# Patient Record
Sex: FEMALE | Race: BLACK OR AFRICAN AMERICAN | Employment: UNEMPLOYED | URBAN - METROPOLITAN AREA
[De-identification: names, ages, dates, MRNs, and addresses within clinical notes are randomized per-mention and may not be internally consistent; named-entity substitution may affect disease eponyms.]

---

## 2017-03-03 PROBLEM — R07.9 CHEST PAIN AT REST: Status: ACTIVE | Noted: 2017-03-03

## 2018-08-17 ENCOUNTER — HOSPITAL ENCOUNTER (INPATIENT)
Age: 58
LOS: 4 days | Discharge: HOME | DRG: 392 | End: 2018-08-23
Attending: FAMILY MEDICINE | Admitting: FAMILY MEDICINE
Payer: COMMERCIAL

## 2018-08-17 DIAGNOSIS — R10.84 GENERALIZED ABDOMINAL PAIN: Primary | ICD-10-CM

## 2018-08-17 PROCEDURE — 6370000000 HC RX 637 (ALT 250 FOR IP): Performed by: FAMILY MEDICINE

## 2018-08-17 PROCEDURE — 6360000002 HC RX W HCPCS: Performed by: FAMILY MEDICINE

## 2018-08-17 PROCEDURE — G0379 DIRECT REFER HOSPITAL OBSERV: HCPCS

## 2018-08-17 PROCEDURE — 96374 THER/PROPH/DIAG INJ IV PUSH: CPT

## 2018-08-17 PROCEDURE — C9113 INJ PANTOPRAZOLE SODIUM, VIA: HCPCS | Performed by: FAMILY MEDICINE

## 2018-08-17 PROCEDURE — 2580000003 HC RX 258: Performed by: FAMILY MEDICINE

## 2018-08-17 PROCEDURE — G0378 HOSPITAL OBSERVATION PER HR: HCPCS

## 2018-08-17 RX ORDER — GABAPENTIN 100 MG/1
100 CAPSULE ORAL 3 TIMES DAILY
Status: DISCONTINUED | OUTPATIENT
Start: 2018-08-17 | End: 2018-08-23 | Stop reason: HOSPADM

## 2018-08-17 RX ORDER — MORPHINE SULFATE 10 MG/ML
5 INJECTION, SOLUTION INTRAMUSCULAR; INTRAVENOUS
Status: COMPLETED | OUTPATIENT
Start: 2018-08-17 | End: 2018-08-17

## 2018-08-17 RX ORDER — SODIUM CHLORIDE 450 MG/100ML
INJECTION, SOLUTION INTRAVENOUS CONTINUOUS
Status: DISCONTINUED | OUTPATIENT
Start: 2018-08-17 | End: 2018-08-23 | Stop reason: HOSPADM

## 2018-08-17 RX ORDER — OXYCODONE HYDROCHLORIDE 15 MG/1
15 TABLET ORAL EVERY 4 HOURS PRN
Status: DISCONTINUED | OUTPATIENT
Start: 2018-08-17 | End: 2018-08-23 | Stop reason: HOSPADM

## 2018-08-17 RX ORDER — CARVEDILOL 25 MG/1
25 TABLET ORAL 2 TIMES DAILY WITH MEALS
Status: DISCONTINUED | OUTPATIENT
Start: 2018-08-18 | End: 2018-08-23 | Stop reason: HOSPADM

## 2018-08-17 RX ADMIN — GABAPENTIN 100 MG: 100 CAPSULE ORAL at 21:33

## 2018-08-17 RX ADMIN — OXYCODONE HYDROCHLORIDE 15 MG: 15 TABLET ORAL at 21:33

## 2018-08-17 RX ADMIN — MORPHINE SULFATE 5 MG: 10 INJECTION INTRAVENOUS at 22:44

## 2018-08-17 RX ADMIN — SODIUM CHLORIDE 8 MG/HR: 9 INJECTION, SOLUTION INTRAVENOUS at 22:16

## 2018-08-17 RX ADMIN — SODIUM CHLORIDE: 4.5 INJECTION, SOLUTION INTRAVENOUS at 22:16

## 2018-08-17 ASSESSMENT — PAIN SCALES - GENERAL
PAINLEVEL_OUTOF10: 8
PAINLEVEL_OUTOF10: 8
PAINLEVEL_OUTOF10: 7
PAINLEVEL_OUTOF10: 8

## 2018-08-17 ASSESSMENT — PAIN DESCRIPTION - ONSET: ONSET: ON-GOING

## 2018-08-17 ASSESSMENT — PAIN DESCRIPTION - DESCRIPTORS: DESCRIPTORS: ACHING;DISCOMFORT

## 2018-08-17 ASSESSMENT — PAIN DESCRIPTION - LOCATION: LOCATION: ABDOMEN

## 2018-08-17 ASSESSMENT — PAIN DESCRIPTION - FREQUENCY: FREQUENCY: CONTINUOUS

## 2018-08-17 ASSESSMENT — PAIN DESCRIPTION - ORIENTATION: ORIENTATION: RIGHT;LEFT;UPPER

## 2018-08-17 ASSESSMENT — PAIN DESCRIPTION - PAIN TYPE: TYPE: ACUTE PAIN

## 2018-08-17 ASSESSMENT — PAIN DESCRIPTION - PROGRESSION: CLINICAL_PROGRESSION: GRADUALLY WORSENING

## 2018-08-18 ENCOUNTER — ANESTHESIA (OUTPATIENT)
Dept: ENDOSCOPY | Age: 58
DRG: 392 | End: 2018-08-18
Payer: COMMERCIAL

## 2018-08-18 ENCOUNTER — ANESTHESIA EVENT (OUTPATIENT)
Dept: ENDOSCOPY | Age: 58
DRG: 392 | End: 2018-08-18
Payer: COMMERCIAL

## 2018-08-18 ENCOUNTER — APPOINTMENT (OUTPATIENT)
Dept: GENERAL RADIOLOGY | Age: 58
DRG: 392 | End: 2018-08-18
Attending: FAMILY MEDICINE
Payer: COMMERCIAL

## 2018-08-18 ENCOUNTER — APPOINTMENT (OUTPATIENT)
Dept: CT IMAGING | Age: 58
DRG: 392 | End: 2018-08-18
Attending: FAMILY MEDICINE
Payer: COMMERCIAL

## 2018-08-18 VITALS
OXYGEN SATURATION: 98 % | SYSTOLIC BLOOD PRESSURE: 121 MMHG | DIASTOLIC BLOOD PRESSURE: 66 MMHG | RESPIRATION RATE: 12 BRPM

## 2018-08-18 LAB
ALBUMIN SERPL-MCNC: 3.4 G/DL (ref 3.5–5.2)
ALP BLD-CCNC: 75 U/L (ref 35–104)
ALT SERPL-CCNC: 13 U/L (ref 0–32)
AMYLASE: 37 U/L (ref 20–100)
ANION GAP SERPL CALCULATED.3IONS-SCNC: 11 MMOL/L (ref 7–16)
AST SERPL-CCNC: 19 U/L (ref 0–31)
BILIRUB SERPL-MCNC: 0.4 MG/DL (ref 0–1.2)
BUN BLDV-MCNC: 15 MG/DL (ref 6–20)
CALCIUM SERPL-MCNC: 8.3 MG/DL (ref 8.6–10.2)
CEA: 3 NG/ML (ref 0–5.2)
CHLORIDE BLD-SCNC: 108 MMOL/L (ref 98–107)
CHOLESTEROL, TOTAL: 146 MG/DL (ref 0–199)
CO2: 21 MMOL/L (ref 22–29)
CREAT SERPL-MCNC: 1.1 MG/DL (ref 0.5–1)
GFR AFRICAN AMERICAN: >60
GFR NON-AFRICAN AMERICAN: >60 ML/MIN/1.73
GLUCOSE BLD-MCNC: 92 MG/DL (ref 74–109)
HCT VFR BLD CALC: 38.3 % (ref 34–48)
HDLC SERPL-MCNC: 37 MG/DL
HEMOGLOBIN: 12.1 G/DL (ref 11.5–15.5)
INR BLD: 1.1
LDL CHOLESTEROL CALCULATED: 88 MG/DL (ref 0–99)
LIPASE: 16 U/L (ref 13–60)
MCH RBC QN AUTO: 28.1 PG (ref 26–35)
MCHC RBC AUTO-ENTMCNC: 31.6 % (ref 32–34.5)
MCV RBC AUTO: 88.9 FL (ref 80–99.9)
PDW BLD-RTO: 13.1 FL (ref 11.5–15)
PLATELET # BLD: 179 E9/L (ref 130–450)
PMV BLD AUTO: 10.7 FL (ref 7–12)
POTASSIUM SERPL-SCNC: 3.9 MMOL/L (ref 3.5–5)
PROTHROMBIN TIME: 12.1 SEC (ref 9.3–12.4)
RBC # BLD: 4.31 E12/L (ref 3.5–5.5)
RHEUMATOID FACTOR: <10 IU/ML (ref 0–13)
SEDIMENTATION RATE, ERYTHROCYTE: 25 MM/HR (ref 0–20)
SODIUM BLD-SCNC: 140 MMOL/L (ref 132–146)
TOTAL PROTEIN: 6.3 G/DL (ref 6.4–8.3)
TRIGL SERPL-MCNC: 106 MG/DL (ref 0–149)
TSH SERPL DL<=0.05 MIU/L-ACNC: 2 UIU/ML (ref 0.27–4.2)
VLDLC SERPL CALC-MCNC: 21 MG/DL
WBC # BLD: 4 E9/L (ref 4.5–11.5)

## 2018-08-18 PROCEDURE — 85027 COMPLETE CBC AUTOMATED: CPT

## 2018-08-18 PROCEDURE — 84443 ASSAY THYROID STIM HORMONE: CPT

## 2018-08-18 PROCEDURE — 82378 CARCINOEMBRYONIC ANTIGEN: CPT

## 2018-08-18 PROCEDURE — 3700000000 HC ANESTHESIA ATTENDED CARE: Performed by: INTERNAL MEDICINE

## 2018-08-18 PROCEDURE — 88305 TISSUE EXAM BY PATHOLOGIST: CPT

## 2018-08-18 PROCEDURE — 85610 PROTHROMBIN TIME: CPT

## 2018-08-18 PROCEDURE — C9113 INJ PANTOPRAZOLE SODIUM, VIA: HCPCS | Performed by: FAMILY MEDICINE

## 2018-08-18 PROCEDURE — 72100 X-RAY EXAM L-S SPINE 2/3 VWS: CPT

## 2018-08-18 PROCEDURE — 73630 X-RAY EXAM OF FOOT: CPT

## 2018-08-18 PROCEDURE — 7100000011 HC PHASE II RECOVERY - ADDTL 15 MIN: Performed by: INTERNAL MEDICINE

## 2018-08-18 PROCEDURE — 86200 CCP ANTIBODY: CPT

## 2018-08-18 PROCEDURE — 6360000002 HC RX W HCPCS: Performed by: FAMILY MEDICINE

## 2018-08-18 PROCEDURE — 7100000010 HC PHASE II RECOVERY - FIRST 15 MIN: Performed by: INTERNAL MEDICINE

## 2018-08-18 PROCEDURE — 6370000000 HC RX 637 (ALT 250 FOR IP): Performed by: FAMILY MEDICINE

## 2018-08-18 PROCEDURE — 0DB98ZX EXCISION OF DUODENUM, VIA NATURAL OR ARTIFICIAL OPENING ENDOSCOPIC, DIAGNOSTIC: ICD-10-PCS | Performed by: INTERNAL MEDICINE

## 2018-08-18 PROCEDURE — 2580000003 HC RX 258: Performed by: FAMILY MEDICINE

## 2018-08-18 PROCEDURE — 2580000003 HC RX 258: Performed by: NURSE ANESTHETIST, CERTIFIED REGISTERED

## 2018-08-18 PROCEDURE — 86431 RHEUMATOID FACTOR QUANT: CPT

## 2018-08-18 PROCEDURE — 83690 ASSAY OF LIPASE: CPT

## 2018-08-18 PROCEDURE — 36415 COLL VENOUS BLD VENIPUNCTURE: CPT

## 2018-08-18 PROCEDURE — G0378 HOSPITAL OBSERVATION PER HR: HCPCS

## 2018-08-18 PROCEDURE — 80053 COMPREHEN METABOLIC PANEL: CPT

## 2018-08-18 PROCEDURE — 6360000002 HC RX W HCPCS: Performed by: NURSE ANESTHETIST, CERTIFIED REGISTERED

## 2018-08-18 PROCEDURE — 6360000004 HC RX CONTRAST MEDICATION: Performed by: RADIOLOGY

## 2018-08-18 PROCEDURE — 80061 LIPID PANEL: CPT

## 2018-08-18 PROCEDURE — 86038 ANTINUCLEAR ANTIBODIES: CPT

## 2018-08-18 PROCEDURE — 82150 ASSAY OF AMYLASE: CPT

## 2018-08-18 PROCEDURE — 86301 IMMUNOASSAY TUMOR CA 19-9: CPT

## 2018-08-18 PROCEDURE — 71110 X-RAY EXAM RIBS BIL 3 VIEWS: CPT

## 2018-08-18 PROCEDURE — 3609012400 HC EGD TRANSORAL BIOPSY SINGLE/MULTIPLE: Performed by: INTERNAL MEDICINE

## 2018-08-18 PROCEDURE — 2709999900 HC NON-CHARGEABLE SUPPLY: Performed by: INTERNAL MEDICINE

## 2018-08-18 PROCEDURE — C1773 RET DEV, INSERTABLE: HCPCS | Performed by: INTERNAL MEDICINE

## 2018-08-18 PROCEDURE — 0DB48ZX EXCISION OF ESOPHAGOGASTRIC JUNCTION, VIA NATURAL OR ARTIFICIAL OPENING ENDOSCOPIC, DIAGNOSTIC: ICD-10-PCS | Performed by: INTERNAL MEDICINE

## 2018-08-18 PROCEDURE — 88342 IMHCHEM/IMCYTCHM 1ST ANTB: CPT

## 2018-08-18 PROCEDURE — 74178 CT ABD&PLV WO CNTR FLWD CNTR: CPT

## 2018-08-18 PROCEDURE — 82105 ALPHA-FETOPROTEIN SERUM: CPT

## 2018-08-18 PROCEDURE — 3700000001 HC ADD 15 MINUTES (ANESTHESIA): Performed by: INTERNAL MEDICINE

## 2018-08-18 PROCEDURE — 85651 RBC SED RATE NONAUTOMATED: CPT

## 2018-08-18 PROCEDURE — 0DB78ZX EXCISION OF STOMACH, PYLORUS, VIA NATURAL OR ARTIFICIAL OPENING ENDOSCOPIC, DIAGNOSTIC: ICD-10-PCS | Performed by: INTERNAL MEDICINE

## 2018-08-18 RX ORDER — PANTOPRAZOLE SODIUM 40 MG/1
40 TABLET, DELAYED RELEASE ORAL
Status: DISCONTINUED | OUTPATIENT
Start: 2018-08-19 | End: 2018-08-23 | Stop reason: HOSPADM

## 2018-08-18 RX ORDER — ONDANSETRON 2 MG/ML
4 INJECTION INTRAMUSCULAR; INTRAVENOUS EVERY 6 HOURS PRN
Status: DISCONTINUED | OUTPATIENT
Start: 2018-08-18 | End: 2018-08-23 | Stop reason: HOSPADM

## 2018-08-18 RX ORDER — MORPHINE SULFATE 10 MG/ML
5 INJECTION, SOLUTION INTRAMUSCULAR; INTRAVENOUS EVERY 4 HOURS PRN
Status: DISCONTINUED | OUTPATIENT
Start: 2018-08-18 | End: 2018-08-23 | Stop reason: HOSPADM

## 2018-08-18 RX ORDER — PROPOFOL 10 MG/ML
INJECTION, EMULSION INTRAVENOUS CONTINUOUS PRN
Status: DISCONTINUED | OUTPATIENT
Start: 2018-08-18 | End: 2018-08-18 | Stop reason: SDUPTHER

## 2018-08-18 RX ORDER — MIDAZOLAM HYDROCHLORIDE 1 MG/ML
INJECTION INTRAMUSCULAR; INTRAVENOUS PRN
Status: DISCONTINUED | OUTPATIENT
Start: 2018-08-18 | End: 2018-08-18 | Stop reason: SDUPTHER

## 2018-08-18 RX ORDER — SODIUM CHLORIDE 9 MG/ML
INJECTION, SOLUTION INTRAVENOUS CONTINUOUS PRN
Status: DISCONTINUED | OUTPATIENT
Start: 2018-08-18 | End: 2018-08-18 | Stop reason: SDUPTHER

## 2018-08-18 RX ORDER — MORPHINE SULFATE 10 MG/ML
10 INJECTION, SOLUTION INTRAMUSCULAR; INTRAVENOUS EVERY 4 HOURS PRN
Status: DISCONTINUED | OUTPATIENT
Start: 2018-08-18 | End: 2018-08-23 | Stop reason: HOSPADM

## 2018-08-18 RX ORDER — PROPOFOL 10 MG/ML
INJECTION, EMULSION INTRAVENOUS PRN
Status: DISCONTINUED | OUTPATIENT
Start: 2018-08-18 | End: 2018-08-18 | Stop reason: SDUPTHER

## 2018-08-18 RX ADMIN — OXYCODONE HYDROCHLORIDE 15 MG: 15 TABLET ORAL at 05:23

## 2018-08-18 RX ADMIN — SODIUM CHLORIDE: 4.5 INJECTION, SOLUTION INTRAVENOUS at 19:06

## 2018-08-18 RX ADMIN — MIDAZOLAM 2 MG: 1 INJECTION INTRAMUSCULAR; INTRAVENOUS at 16:30

## 2018-08-18 RX ADMIN — CARVEDILOL 25 MG: 25 TABLET, FILM COATED ORAL at 17:27

## 2018-08-18 RX ADMIN — PROPOFOL 90 MG: 10 INJECTION, EMULSION INTRAVENOUS at 16:33

## 2018-08-18 RX ADMIN — GABAPENTIN 100 MG: 100 CAPSULE ORAL at 20:46

## 2018-08-18 RX ADMIN — SODIUM CHLORIDE 8 MG/HR: 9 INJECTION, SOLUTION INTRAVENOUS at 08:07

## 2018-08-18 RX ADMIN — SODIUM CHLORIDE: 9 INJECTION, SOLUTION INTRAVENOUS at 16:29

## 2018-08-18 RX ADMIN — GABAPENTIN 100 MG: 100 CAPSULE ORAL at 13:28

## 2018-08-18 RX ADMIN — OXYCODONE HYDROCHLORIDE 15 MG: 15 TABLET ORAL at 11:30

## 2018-08-18 RX ADMIN — PROPOFOL 100 MCG/KG/MIN: 10 INJECTION, EMULSION INTRAVENOUS at 16:33

## 2018-08-18 RX ADMIN — OXYCODONE HYDROCHLORIDE 15 MG: 15 TABLET ORAL at 17:27

## 2018-08-18 RX ADMIN — MORPHINE SULFATE 5 MG: 10 INJECTION INTRAVENOUS at 19:06

## 2018-08-18 RX ADMIN — IOPAMIDOL 80 ML: 755 INJECTION, SOLUTION INTRAVENOUS at 13:11

## 2018-08-18 RX ADMIN — CARVEDILOL 25 MG: 25 TABLET, FILM COATED ORAL at 11:30

## 2018-08-18 RX ADMIN — ONDANSETRON 4 MG: 2 INJECTION INTRAMUSCULAR; INTRAVENOUS at 21:07

## 2018-08-18 ASSESSMENT — ENCOUNTER SYMPTOMS
DIARRHEA: 0
SHORTNESS OF BREATH: 0
VOMITING: 0
COUGH: 1
NAUSEA: 1

## 2018-08-18 ASSESSMENT — PAIN SCALES - GENERAL
PAINLEVEL_OUTOF10: 6
PAINLEVEL_OUTOF10: 7
PAINLEVEL_OUTOF10: 0
PAINLEVEL_OUTOF10: 7
PAINLEVEL_OUTOF10: 5
PAINLEVEL_OUTOF10: 0
PAINLEVEL_OUTOF10: 7
PAINLEVEL_OUTOF10: 6
PAINLEVEL_OUTOF10: 0
PAINLEVEL_OUTOF10: 7

## 2018-08-18 ASSESSMENT — PAIN DESCRIPTION - PAIN TYPE
TYPE: ACUTE PAIN
TYPE: ACUTE PAIN

## 2018-08-18 ASSESSMENT — PAIN DESCRIPTION - ORIENTATION: ORIENTATION: RIGHT;LEFT

## 2018-08-18 ASSESSMENT — PAIN DESCRIPTION - DESCRIPTORS
DESCRIPTORS: ACHING;DISCOMFORT;SORE
DESCRIPTORS: TENDER

## 2018-08-18 ASSESSMENT — PAIN DESCRIPTION - LOCATION
LOCATION: GENERALIZED
LOCATION: ABDOMEN

## 2018-08-18 NOTE — ANESTHESIA PRE PROCEDURE
Degenerative arthritis of lumbar spine M47.816    Anxiety F41.9    Mass of right lobe of liver R16.0    Back pain M54.9    Hypertension I10    Arthritis M19.90    MVP (mitral valve prolapse) I34.1    COPD exacerbation (HCC) J44.1    Abdominal pain R10.9    Gastritis K29.70    Dehydration E86.0    Acute kidney injury ([address] N17.9    Abdominal pain, epigastric R10.13    LOC (loss of consciousness) (HCC) R40.20    Hypotension I95.9    Chest pain at rest R07.9       Past Medical History:        Diagnosis Date    Arthritis     Bursitis     right arm    Chest pain 12/06/2016    lexiscan stress    COPD exacerbation ([address] 2/12/2015    Hypertension     MVP (mitral valve prolapse)        Past Surgical History:        Procedure Laterality Date    ABDOMEN SURGERY      APPENDECTOMY      BACK SURGERY      disc fracture in lumbar spine removed, neck same    EYE SURGERY      KNEE SURGERY      cyst on knee    LUMBAR FUSION  3/28/13    plif  L4-L5    TONSILLECTOMY         Social History:    Social History   Substance Use Topics    Smoking status: Current Every Day Smoker     Packs/day: 0.25     Years: 44.00     Types: Cigarettes    Smokeless tobacco: Never Used      Comment: trying to quit    Alcohol use Yes      Comment: very occasional                                Ready to quit: Yes  Counseling given: Yes      Vital Signs (Current):   Vitals:    08/17/18 2030 08/17/18 2136 08/18/18 0803   BP: (!) 141/69  135/69   Pulse: 69  72   Resp: 18  16   Temp: 97.4 °F (36.3 °C)  97.6 °F (36.4 °C)   TempSrc: Oral  Oral   SpO2: 100%  98%   Weight:  192 lb 6.4 oz (87.3 kg)    Height:  5' 3\" (1.6 m)                                               BP Readings from Last 3 Encounters:   08/18/18 135/69   03/16/17 (!) 161/78   03/04/17 (!) 112/51       NPO Status:                                                                                 BMI:   Wt Readings from Last 3 Encounters:   08/17/18 192 lb 6.4 oz (87.3 kg) 03/16/17 181 lb (82.1 kg)   03/02/17 186 lb 3.2 oz (84.5 kg)     Body mass index is 34.08 kg/m². CBC:   Lab Results   Component Value Date    WBC 4.0 08/18/2018    RBC 4.31 08/18/2018    HGB 12.1 08/18/2018    HCT 38.3 08/18/2018    MCV 88.9 08/18/2018    RDW 13.1 08/18/2018     08/18/2018       CMP:   Lab Results   Component Value Date     08/18/2018    K 3.9 08/18/2018     08/18/2018    CO2 21 08/18/2018    BUN 15 08/18/2018    CREATININE 1.1 08/18/2018    GFRAA >60 08/18/2018    LABGLOM >60 08/18/2018    GLUCOSE 92 08/18/2018    GLUCOSE 120 08/30/2011    PROT 6.3 08/18/2018    CALCIUM 8.3 08/18/2018    BILITOT 0.4 08/18/2018    ALKPHOS 75 08/18/2018    AST 19 08/18/2018    ALT 13 08/18/2018       POC Tests: No results for input(s): POCGLU, POCNA, POCK, POCCL, POCBUN, POCHEMO, POCHCT in the last 72 hours. Coags:   Lab Results   Component Value Date    PROTIME 9.6 02/25/2015    INR 0.8 02/25/2015    APTT 20.7 02/25/2015       HCG (If Applicable): No results found for: PREGTESTUR, PREGSERUM, HCG, HCGQUANT     ABGs: No results found for: PHART, PO2ART, IVS8SGP, JNZ0CAA, BEART, B9ELXWXG     Type & Screen (If Applicable):  No results found for: LABABO, [address]    Anesthesia Evaluation  Patient summary reviewed  Airway: Mallampati: IV  TM distance: >3 FB   Neck ROM: full  Mouth opening: > = 3 FB Dental:          Pulmonary: breath sounds clear to auscultation  (+) COPD:                            ROS comment: Current Smoker . 25 ppd     Cardiovascular:    (+) hypertension:, valvular problems/murmurs: MVP, past MI (H/O Chest pain.):,       ECG reviewed  Rhythm: regular  Rate: normal  Echocardiogram reviewed         Beta Blocker:  Dose within 24 Hrs      ROS comment: EKG: Normal Sinus Rhythm 68, first degree heart block, poss left atrial enlargement. Ashlyn Quiet     ECHO:  Normal left ventricular ejection fraction.   Measured ejection fraction is 54 %.   The left atrium is mildly dilated.   Physiologic mitral

## 2018-08-18 NOTE — H&P
muscles are intact. ENT clear. NECK:  Supple - no bruits. COR:  Regular rate and rhythm. LUNGS:  Clear. ABDOMEN:  Soft. Positive midepigastric abdominal pain. Positive rebound  in midepigastric area. No spasm or rigidity. Bowel sounds are positive. EXTREMITIES:  No clubbing, no edema, no cyanosis. Dorsalis 1+. NEUROLOGIC:  Cranial nerves II through XII intact distally, grossly intact. ASSESSMENT:  1. Acute abdominal pain/gastritis. 2.  Intractable nausea. 3.  Hypertension. 4.  Arthritis of the knees. 5.  Arthritis of the lumbar spine. PLAN:  Admit, we will place her on IV Protonix, we will obtain the CT scan  of the abdomen and the pelvis. Additionally, GI group will be consulted  for possible endoscopy secondary to the patient's severity of symptoms.         Jacques Mckinney MD    D: 08/18/2018 10:39:59       T: 08/18/2018 11:19:10     [email or web address]  Job#: [id number]     Doc#: [id number]    CC:

## 2018-08-18 NOTE — CONSULTS
history of GI issues or malignancy. She reports rare alcohol use 1-2 times per year. She currently smokes 6-7 cigarettes per day. She reports that she smokes marijuana when she was a teenager, but otherwise denies current or past recreational or illicit drug use. TRIAGE VITALS  BP: 135/69, Temp: 97.6 °F (36.4 °C), Pulse: 72, Resp: 16, SpO2: 98 %    Vitals:    08/17/18 2030 08/17/18 2136 08/18/18 0803   BP: (!) 141/69  135/69   Pulse: 69  72   Resp: 18  16   Temp: 97.4 °F (36.3 °C)  97.6 °F (36.4 °C)   TempSrc: Oral  Oral   SpO2: 100%  98%   Weight:  192 lb 6.4 oz (87.3 kg)    Height:  5' 3\" (1.6 m)          Histories  Past Medical History:   Diagnosis Date    Arthritis     Bursitis     right arm    Chest pain 12/06/2016    lexiscan stress    COPD exacerbation ([facility] 2/12/2015    Hypertension     MVP (mitral valve prolapse)      Past Surgical History:   Procedure Laterality Date    ABDOMEN SURGERY      APPENDECTOMY      BACK SURGERY      disc fracture in lumbar spine removed, neck same    EYE SURGERY      KNEE SURGERY      cyst on knee    LUMBAR FUSION  3/28/13    plif  L4-L5    TONSILLECTOMY       Family History   Problem Relation Age of Onset    Cancer Mother     Arthritis Mother     Asthma Mother     Depression Mother     Diabetes Mother     Heart Disease Mother     High Blood Pressure Mother     High Cholesterol Mother     Arthritis Father     Asthma Father     Cancer Father     Depression Father     Diabetes Father     Heart Disease Father     High Blood Pressure Father     High Cholesterol Father        Home Medications  Prior to Admission medications    Medication Sig Start Date End Date Taking?  Authorizing Provider   carvedilol (COREG) 12.5 MG tablet Take 25 mg by mouth 2 times daily (with meals)     Historical Provider, MD   GABAPENTIN PO Take by mouth as needed     Historical Provider, MD   oxyCODONE (ROXICODONE) 15 MG immediate release tablet Take 15 mg by mouth every 4 hours appears stated age and cooperative; no apparent distress no labored breathing  HEENT:  NCAT; PERRL; conjunctiva pink, sclera clear  Neck:  no adenopathy, bruit, JVD, tenderness, masses, or nodules; supple, symmetrical, trachea midline, thyroid not enlarged  Lung:  clear to auscultation bilaterally; no use of accessory muscles; no rhonchi, rales, or crackles  Heart:  regular rate and regular rhythm without murmur, rub, or gallop  Abdomen:  soft, tender epigastrium, nondistended; normoactive bowel sounds; no organomegaly  Extremities:  extremities normal, atraumatic, no cyanosis or edema  Musculokeletal:  no joint swelling, no muscle tenderness. ROM normal in all joints of extremities.    Neurologic:  mental status A&Ox3, thought content appropriate; CN II-XII grossly intact; sensation intact, motor strength 5/5 globally; no slurred speech    Laboratory Data  Recent Results (from the past 24 hour(s))   CBC    Collection Time: 08/18/18  4:31 AM   Result Value Ref Range    WBC 4.0 (L) 4.5 - 11.5 E9/L    RBC 4.31 3.50 - 5.50 E12/L    Hemoglobin 12.1 11.5 - 15.5 g/dL    Hematocrit 38.3 34.0 - 48.0 %    MCV 88.9 80.0 - 99.9 fL    MCH 28.1 26.0 - 35.0 pg    MCHC 31.6 (L) 32.0 - 34.5 %    RDW 13.1 11.5 - 15.0 fL    Platelets 495 532 - 069 E9/L    MPV 10.7 7.0 - 12.0 fL   Comprehensive metabolic panel    Collection Time: 08/18/18  4:31 AM   Result Value Ref Range    Sodium 140 132 - 146 mmol/L    Potassium 3.9 3.5 - 5.0 mmol/L    Chloride 108 (H) 98 - 107 mmol/L    CO2 21 (L) 22 - 29 mmol/L    Anion Gap 11 7 - 16 mmol/L    Glucose 92 74 - 109 mg/dL    BUN 15 6 - 20 mg/dL    CREATININE 1.1 (H) 0.5 - 1.0 mg/dL    GFR Non-African American >60 >=60 mL/min/1.73    GFR African American >60     Calcium 8.3 (L) 8.6 - 10.2 mg/dL    Total Protein 6.3 (L) 6.4 - 8.3 g/dL    Alb 3.4 (L) 3.5 - 5.2 g/dL    Total Bilirubin 0.4 0.0 - 1.2 mg/dL    Alkaline Phosphatase 75 35 - 104 U/L    ALT 13 0 - 32 U/L    AST 19 0 - 31 U/L   TSH without Reflex    Collection Time: 08/18/18  4:31 AM   Result Value Ref Range    TSH 2.000 0.270 - 4.200 uIU/mL   Lipid panel    Collection Time: 08/18/18  4:31 AM   Result Value Ref Range    Cholesterol, Total 146 0 - 199 mg/dL    Triglycerides 106 0 - 149 mg/dL    HDL 37 >40 mg/dL    LDL Calculated 88 0 - 99 mg/dL    VLDL Cholesterol Calculated 21 mg/dL       Imaging  Ct Abdomen Pelvis W Wo Contrast Additional Contrast? None    Result Date: 8/18/2018  Reading location: 200 INDICATION: Abdominal pain FINDINGS: Axial CT images through the abdomen and pelvis pre and post intravenous injection 80 mL Isovue-370. No oral contrast which limits evaluation of bowel and adjacent structures. Automated dose control. . Comparison with previous including 4/8/2016. Mild cardiac enlargement is unchanged. Few peripheral bands of atelectasis or fibrosis in each lung base. No acute abnormality of the abdominal solid organs. Cyst posterior cortex medial right kidney 15 mm. Multinodular mass lateral aspect right lobe of the liver 46 x 22 mm was described in the prior report, but is increased in size. Bowel and bile ducts and urinary tract normal caliber. Multifocal atherosclerosis. No abdominal or pelvic fluid collection or abnormally enlarged lymph nodes. Chronic spondylotic and postoperative changes lumbar spine. 1. No acute finding. 2. Enhancing lesion in the right lobe of the liver could be solid nodule such as adenoma or FNH or possibly cavernous hemangioma. MRI with liver mass protocol recommended. Xr Ribs Bilateral (3 Views)    Result Date: 8/18/2018  Location:200 Exam: XR RIBS BILATERAL (3 VIEWS) Comparison: None. History: Pain Findings: PA expiratory view of the chest and 7 views of the bilateral  ribs obtained. No evidence of fracture, osseous destructive lesion or large pneumothorax or hemothorax. No focal abnormality.      Xr Lumbar Spine (2-3 Views)    Result Date: 8/18/2018  Reading location: 200 INDICATION: Low back pain FINDINGS: AP and lateral views lumbar spine compared with 9/20/2016. Postoperative changes L4-5 appear stable and posterior pedicle screw plates intact. Disc space narrowing L3-4 with subchondral sclerosis and spur spurring has progressed. No acute malalignment or acute osseous abnormality. Postoperative and spondylotic changes as discussed. Xr Foot Left (min 3 Views)    Result Date: 8/18/2018  Location:200 Exam: XR FOOT LEFT (MIN 3 VIEWS) Comparison: None. History: Pain Findings: Three views of the left foot were obtained. Bony alignment maintained as are joint spaces. No fracture or focal osseous abnormality. No acute abnormality. Xr Foot Right (min 3 Views)    Result Date: 8/18/2018  Location:200 Exam: XR FOOT RIGHT (MIN 3 VIEWS) Comparison: None. History: Pain Findings: Three views of the right foot were obtained. Bony alignment maintained as are joint spaces. No fracture or focal osseous abnormality. No acute abnormality. Assessment and Plan  Patient is a 60 y.o. female on consult for abdominal pain. 1.  Abdominal pain - Epigastric pain. CT non-revealing for source of pain. Check pancreatic enzymes. Cannot rule out gastric source. PPI drip per admitting. Plan for EGD. Pain management per admitting. 2.  Diarrhea - Onset 3-4 days ago. Check stool studies. Will request recent colonoscopy report from Dr. Shereen Talbert. Consider repeat colonoscopy. 3.  Liver lesion - Per CT, increased in size. Check AFP / CEA / . Evaluate MRI. 4.  Colon polyps - Per patient report. Last colonoscopy 5-6 years ago. Records requested. Repeat colonoscopy inpatient versus outpatient. PPI drip per admitting. EGD today. Check pancreatic enzymes. Evaluate tumor markers. Check MRI to investigate liver lesion. Check stool studies. Further orders will depend on patient source and results of testing.   Thank you for the opportunity to participate in the care of Ms. Castrejon

## 2018-08-19 ENCOUNTER — APPOINTMENT (OUTPATIENT)
Dept: MRI IMAGING | Age: 58
DRG: 392 | End: 2018-08-19
Attending: FAMILY MEDICINE
Payer: COMMERCIAL

## 2018-08-19 LAB
ALBUMIN SERPL-MCNC: 3.6 G/DL (ref 3.5–5.2)
ALP BLD-CCNC: 80 U/L (ref 35–104)
ALT SERPL-CCNC: 13 U/L (ref 0–32)
ANION GAP SERPL CALCULATED.3IONS-SCNC: 13 MMOL/L (ref 7–16)
AST SERPL-CCNC: 19 U/L (ref 0–31)
BASOPHILS ABSOLUTE: 0 E9/L (ref 0–0.2)
BASOPHILS RELATIVE PERCENT: 0.2 % (ref 0–2)
BILIRUB SERPL-MCNC: 0.2 MG/DL (ref 0–1.2)
BUN BLDV-MCNC: 14 MG/DL (ref 6–20)
CALCIUM SERPL-MCNC: 8.1 MG/DL (ref 8.6–10.2)
CHLORIDE BLD-SCNC: 107 MMOL/L (ref 98–107)
CO2: 20 MMOL/L (ref 22–29)
CREAT SERPL-MCNC: 1.1 MG/DL (ref 0.5–1)
EOSINOPHILS ABSOLUTE: 0.04 E9/L (ref 0.05–0.5)
EOSINOPHILS RELATIVE PERCENT: 0.9 % (ref 0–6)
GFR AFRICAN AMERICAN: >60
GFR NON-AFRICAN AMERICAN: >60 ML/MIN/1.73
GLUCOSE BLD-MCNC: 126 MG/DL (ref 74–109)
HCT VFR BLD CALC: 36.6 % (ref 34–48)
HEMOGLOBIN: 11.5 G/DL (ref 11.5–15.5)
LIPASE: 22 U/L (ref 13–60)
LYMPHOCYTES ABSOLUTE: 1.85 E9/L (ref 1.5–4)
LYMPHOCYTES RELATIVE PERCENT: 43.9 % (ref 20–42)
MCH RBC QN AUTO: 28.1 PG (ref 26–35)
MCHC RBC AUTO-ENTMCNC: 31.4 % (ref 32–34.5)
MCV RBC AUTO: 89.5 FL (ref 80–99.9)
MONOCYTES ABSOLUTE: 0.46 E9/L (ref 0.1–0.95)
MONOCYTES RELATIVE PERCENT: 10.5 % (ref 2–12)
NEUTROPHILS ABSOLUTE: 1.89 E9/L (ref 1.8–7.3)
NEUTROPHILS RELATIVE PERCENT: 44.7 % (ref 43–80)
PDW BLD-RTO: 13.2 FL (ref 11.5–15)
PLATELET # BLD: 185 E9/L (ref 130–450)
PMV BLD AUTO: 10.6 FL (ref 7–12)
POTASSIUM SERPL-SCNC: 3.6 MMOL/L (ref 3.5–5)
RBC # BLD: 4.09 E12/L (ref 3.5–5.5)
RBC # BLD: NORMAL 10*6/UL
SODIUM BLD-SCNC: 140 MMOL/L (ref 132–146)
TOTAL PROTEIN: 6.4 G/DL (ref 6.4–8.3)
WBC # BLD: 4.2 E9/L (ref 4.5–11.5)

## 2018-08-19 PROCEDURE — 6360000002 HC RX W HCPCS: Performed by: FAMILY MEDICINE

## 2018-08-19 PROCEDURE — 2580000003 HC RX 258: Performed by: FAMILY MEDICINE

## 2018-08-19 PROCEDURE — 6370000000 HC RX 637 (ALT 250 FOR IP): Performed by: FAMILY MEDICINE

## 2018-08-19 PROCEDURE — 6370000000 HC RX 637 (ALT 250 FOR IP): Performed by: INTERNAL MEDICINE

## 2018-08-19 PROCEDURE — 85025 COMPLETE CBC W/AUTO DIFF WBC: CPT

## 2018-08-19 PROCEDURE — 36415 COLL VENOUS BLD VENIPUNCTURE: CPT

## 2018-08-19 PROCEDURE — 83690 ASSAY OF LIPASE: CPT

## 2018-08-19 PROCEDURE — 1200000000 HC SEMI PRIVATE

## 2018-08-19 PROCEDURE — 80053 COMPREHEN METABOLIC PANEL: CPT

## 2018-08-19 RX ADMIN — MORPHINE SULFATE 5 MG: 10 INJECTION INTRAVENOUS at 19:23

## 2018-08-19 RX ADMIN — CARVEDILOL 25 MG: 25 TABLET, FILM COATED ORAL at 08:01

## 2018-08-19 RX ADMIN — OXYCODONE HYDROCHLORIDE 15 MG: 15 TABLET ORAL at 01:37

## 2018-08-19 RX ADMIN — PANTOPRAZOLE SODIUM 40 MG: 40 TABLET, DELAYED RELEASE ORAL at 06:25

## 2018-08-19 RX ADMIN — ONDANSETRON 4 MG: 2 INJECTION INTRAMUSCULAR; INTRAVENOUS at 04:07

## 2018-08-19 RX ADMIN — SODIUM CHLORIDE: 4.5 INJECTION, SOLUTION INTRAVENOUS at 10:01

## 2018-08-19 RX ADMIN — OXYCODONE HYDROCHLORIDE 15 MG: 15 TABLET ORAL at 17:13

## 2018-08-19 RX ADMIN — GABAPENTIN 100 MG: 100 CAPSULE ORAL at 20:44

## 2018-08-19 RX ADMIN — GABAPENTIN 100 MG: 100 CAPSULE ORAL at 08:01

## 2018-08-19 RX ADMIN — MORPHINE SULFATE 5 MG: 10 INJECTION INTRAVENOUS at 04:07

## 2018-08-19 RX ADMIN — MORPHINE SULFATE 5 MG: 10 INJECTION INTRAVENOUS at 11:47

## 2018-08-19 RX ADMIN — SODIUM CHLORIDE: 4.5 INJECTION, SOLUTION INTRAVENOUS at 17:15

## 2018-08-19 RX ADMIN — GABAPENTIN 100 MG: 100 CAPSULE ORAL at 13:13

## 2018-08-19 RX ADMIN — OXYCODONE HYDROCHLORIDE 15 MG: 15 TABLET ORAL at 08:38

## 2018-08-19 RX ADMIN — CARVEDILOL 25 MG: 25 TABLET, FILM COATED ORAL at 17:13

## 2018-08-19 ASSESSMENT — PAIN SCALES - GENERAL
PAINLEVEL_OUTOF10: 5
PAINLEVEL_OUTOF10: 9
PAINLEVEL_OUTOF10: 4
PAINLEVEL_OUTOF10: 6
PAINLEVEL_OUTOF10: 7
PAINLEVEL_OUTOF10: 7
PAINLEVEL_OUTOF10: 6
PAINLEVEL_OUTOF10: 5
PAINLEVEL_OUTOF10: 10

## 2018-08-19 ASSESSMENT — PAIN DESCRIPTION - PAIN TYPE
TYPE: ACUTE PAIN

## 2018-08-19 ASSESSMENT — PAIN DESCRIPTION - ORIENTATION: ORIENTATION: OTHER (COMMENT)

## 2018-08-19 ASSESSMENT — PAIN DESCRIPTION - DESCRIPTORS
DESCRIPTORS: ACHING;DISCOMFORT;SORE
DESCRIPTORS: ACHING;DISCOMFORT;SORE
DESCRIPTORS: ACHING;SORE

## 2018-08-19 ASSESSMENT — PAIN DESCRIPTION - LOCATION
LOCATION: GENERALIZED

## 2018-08-19 ASSESSMENT — ENCOUNTER SYMPTOMS
COUGH: 0
NAUSEA: 1
VOMITING: 0
SHORTNESS OF BREATH: 0
DIARRHEA: 0

## 2018-08-19 NOTE — ANESTHESIA POSTPROCEDURE EVALUATION
Department of Anesthesiology  Postprocedure Note    Patient: Yuliana Samson  MRN: [id number]  YOB: 1960  Date of evaluation: 8/19/2018  Time:  2:27 AM     Procedure Summary     Date:  08/18/18 Room / Location:  [address] ENDOSCOPY    Anesthesia Start:  1629 Anesthesia Stop:  7544    Procedures:       EGD BIOPSY (N/A )      EGD Diagnosis:  (GI BLEED)    Surgeon:  Mili Roberts MD Responsible Provider:  Nabil Stein MD    Anesthesia Type:  MAC ASA Status:  3          Anesthesia Type: MAC    Queenie Phase I:      Queenie Phase II: Queenie Score: 10    Last vitals: Reviewed and per EMR flowsheets.        Anesthesia Post Evaluation    Patient location during evaluation: PACU  Patient participation: complete - patient participated  Level of consciousness: awake  Pain score: 0  Airway patency: patent  Nausea & Vomiting: no nausea  Complications: no  Cardiovascular status: blood pressure returned to baseline  Respiratory status: acceptable  Hydration status: euvolemic

## 2018-08-19 NOTE — CONSULTS
[address]                                   CONSULTATION    PATIENT NAME: Deniz Quintana              :        1960  MED REC NO:   [id number]                            ROOM:       0315  ACCOUNT NO:   [de-identified]                           ADMIT DATE: 2018  PROVIDER:     Dilan Pardo Dpm    CONSULT DATE:  2018    HISTORY OF PRESENT ILLNESS:  The patient was seen yesterday for complaint  of bilateral foot and leg pain. The patient had a brief discussion with  myself while she was in x-ray and came back today to have a more thorough  talk and evaluation. The patient states she has bilateral pain and both  her feet appeared to be equal and symmetric. The patient states that it  feels like her feet on a fire and skin is cracking and she notes that this  is not occurring because she can physically see that. While the patient  was in x-ray today, we did order low back x-rays due to the fact that the  patient has a history of low back surgery. The patient is scheduled for an  EMG/nerve conduction and results at the time of this dictation is pending. The patient is a 50-year-old black female with past medical history of  gastritis, hypertension, arthritis, mitral valve prolapse, and anxiety. Denies cancer. Denies claudication factors and conditions. ALLERGIES:  ASPIRIN, PROCHLORPERAZINE, and TEGRETOL. SOCIAL HISTORY:  The patient does smoke half pack a day. Social drinker. The patient denies illicit drug use. HOME MEDICATIONS:  On admission included Coreg, Neurontin 300 mg three  times daily, and OxyIR 15 mg every six hours as needed for pain. REVIEW OF SYSTEMS:  Essentially negative except for the patient's present  illness. PHYSICAL EXAMINATION:  LOWER EXTREMITIES:  The patient's pulses are easily palpated. Skin  temperature is warm. Hair growth diminished.

## 2018-08-19 NOTE — PROGRESS NOTES
the past 24 hour(s))   Rheumatoid factor    Collection Time: 08/18/18  2:37 PM   Result Value Ref Range    Rheumatoid Factor <10 0 - 13 IU/mL   Sedimentation Rate    Collection Time: 08/18/18  2:37 PM   Result Value Ref Range    Sed Rate 25 (H) 0 - 20 mm/Hr   Amylase    Collection Time: 08/18/18  2:37 PM   Result Value Ref Range    Amylase 37 20 - 100 U/L   Lipase    Collection Time: 08/18/18  2:37 PM   Result Value Ref Range    Lipase 16 13 - 60 U/L   Protime-INR    Collection Time: 08/18/18  2:37 PM   Result Value Ref Range    Protime 12.1 9.3 - 12.4 sec    INR 1.1    CEA    Collection Time: 08/18/18  2:37 PM   Result Value Ref Range    CEA 3.0 0.0 - 5.2 ng/mL   CBC WITH AUTO DIFFERENTIAL    Collection Time: 08/19/18  3:52 AM   Result Value Ref Range    WBC 4.2 (L) 4.5 - 11.5 E9/L    RBC 4.09 3.50 - 5.50 E12/L    Hemoglobin 11.5 11.5 - 15.5 g/dL    Hematocrit 36.6 34.0 - 48.0 %    MCV 89.5 80.0 - 99.9 fL    MCH 28.1 26.0 - 35.0 pg    MCHC 31.4 (L) 32.0 - 34.5 %    RDW 13.2 11.5 - 15.0 fL    Platelets 034 808 - 311 E9/L    MPV 10.6 7.0 - 12.0 fL    Neutrophils % 44.7 43.0 - 80.0 %    Lymphocytes % 43.9 (H) 20.0 - 42.0 %    Monocytes % 10.5 2.0 - 12.0 %    Eosinophils % 0.9 0.0 - 6.0 %    Basophils % 0.2 0.0 - 2.0 %    Neutrophils # 1.89 1.80 - 7.30 E9/L    Lymphocytes # 1.85 1.50 - 4.00 E9/L    Monocytes # 0.46 0.10 - 0.95 E9/L    Eosinophils # 0.04 (L) 0.05 - 0.50 E9/L    Basophils # 0.00 0.00 - 0.20 E9/L    RBC Morphology Normal    Comprehensive metabolic panel    Collection Time: 08/19/18  3:52 AM   Result Value Ref Range    Sodium 140 132 - 146 mmol/L    Potassium 3.6 3.5 - 5.0 mmol/L    Chloride 107 98 - 107 mmol/L    CO2 20 (L) 22 - 29 mmol/L    Anion Gap 13 7 - 16 mmol/L    Glucose 126 (H) 74 - 109 mg/dL    BUN 14 6 - 20 mg/dL    CREATININE 1.1 (H) 0.5 - 1.0 mg/dL    GFR Non-African American >60 >=60 mL/min/1.73    GFR African American >60     Calcium 8.1 (L) 8.6 - 10.2 mg/dL    Total Protein 6.4 6.4 - 8.3 g/dL    Alb 3.6 3.5 - 5.2 g/dL    Total Bilirubin 0.2 0.0 - 1.2 mg/dL    Alkaline Phosphatase 80 35 - 104 U/L    ALT 13 0 - 32 U/L    AST 19 0 - 31 U/L   Lipase    Collection Time: 08/19/18  3:52 AM   Result Value Ref Range    Lipase 22 13 - 60 U/L       Imaging  Ct Abdomen Pelvis W Wo Contrast Additional Contrast? None    Result Date: 8/18/2018  Reading location: 200 INDICATION: Abdominal pain FINDINGS: Axial CT images through the abdomen and pelvis pre and post intravenous injection 80 mL Isovue-370. No oral contrast which limits evaluation of bowel and adjacent structures. Automated dose control. . Comparison with previous including 4/8/2016. Mild cardiac enlargement is unchanged. Few peripheral bands of atelectasis or fibrosis in each lung base. No acute abnormality of the abdominal solid organs. Cyst posterior cortex medial right kidney 15 mm. Multinodular mass lateral aspect right lobe of the liver 46 x 22 mm was described in the prior report, but is increased in size. Bowel and bile ducts and urinary tract normal caliber. Multifocal atherosclerosis. No abdominal or pelvic fluid collection or abnormally enlarged lymph nodes. Chronic spondylotic and postoperative changes lumbar spine. 1. No acute finding. 2. Enhancing lesion in the right lobe of the liver could be solid nodule such as adenoma or FNH or possibly cavernous hemangioma. MRI with liver mass protocol recommended. Xr Ribs Bilateral (3 Views)    Result Date: 8/18/2018  Location:200 Exam: XR RIBS BILATERAL (3 VIEWS) Comparison: None. History: Pain Findings: PA expiratory view of the chest and 7 views of the bilateral  ribs obtained. No evidence of fracture, osseous destructive lesion or large pneumothorax or hemothorax. No focal abnormality. Xr Lumbar Spine (2-3 Views)    Result Date: 8/18/2018  Reading location: 200 INDICATION: Low back pain FINDINGS: AP and lateral views lumbar spine compared with 9/20/2016.  Postoperative changes L4-5

## 2018-08-20 ENCOUNTER — APPOINTMENT (OUTPATIENT)
Dept: NUCLEAR MEDICINE | Age: 58
DRG: 392 | End: 2018-08-20
Attending: FAMILY MEDICINE
Payer: COMMERCIAL

## 2018-08-20 ENCOUNTER — APPOINTMENT (OUTPATIENT)
Dept: MRI IMAGING | Age: 58
DRG: 392 | End: 2018-08-20
Attending: FAMILY MEDICINE
Payer: COMMERCIAL

## 2018-08-20 LAB
ALBUMIN SERPL-MCNC: 3.5 G/DL (ref 3.5–5.2)
ALP BLD-CCNC: 92 U/L (ref 35–104)
ALT SERPL-CCNC: 15 U/L (ref 0–32)
ANION GAP SERPL CALCULATED.3IONS-SCNC: 10 MMOL/L (ref 7–16)
ANTI-NUCLEAR ANTIBODY (ANA): NEGATIVE
AST SERPL-CCNC: 21 U/L (ref 0–31)
BASOPHILS ABSOLUTE: 0 E9/L (ref 0–0.2)
BASOPHILS RELATIVE PERCENT: 0.2 % (ref 0–2)
BILIRUB SERPL-MCNC: 0.2 MG/DL (ref 0–1.2)
BUN BLDV-MCNC: 11 MG/DL (ref 6–20)
CALCIUM SERPL-MCNC: 8 MG/DL (ref 8.6–10.2)
CHLORIDE BLD-SCNC: 108 MMOL/L (ref 98–107)
CO2: 21 MMOL/L (ref 22–29)
CREAT SERPL-MCNC: 1.2 MG/DL (ref 0.5–1)
EOSINOPHILS ABSOLUTE: 0 E9/L (ref 0.05–0.5)
EOSINOPHILS RELATIVE PERCENT: 1.5 % (ref 0–6)
GFR AFRICAN AMERICAN: 56
GFR NON-AFRICAN AMERICAN: 56 ML/MIN/1.73
GLUCOSE BLD-MCNC: 134 MG/DL (ref 74–109)
HCT VFR BLD CALC: 34.5 % (ref 34–48)
HEMOGLOBIN: 10.9 G/DL (ref 11.5–15.5)
LYMPHOCYTES ABSOLUTE: 2.26 E9/L (ref 1.5–4)
LYMPHOCYTES RELATIVE PERCENT: 54.6 % (ref 20–42)
MCH RBC QN AUTO: 28.5 PG (ref 26–35)
MCHC RBC AUTO-ENTMCNC: 31.6 % (ref 32–34.5)
MCV RBC AUTO: 90.1 FL (ref 80–99.9)
MONOCYTES ABSOLUTE: 0.29 E9/L (ref 0.1–0.95)
MONOCYTES RELATIVE PERCENT: 7.4 % (ref 2–12)
NEUTROPHILS ABSOLUTE: 1.56 E9/L (ref 1.8–7.3)
NEUTROPHILS RELATIVE PERCENT: 38 % (ref 43–80)
PDW BLD-RTO: 13.1 FL (ref 11.5–15)
PLATELET # BLD: 169 E9/L (ref 130–450)
PMV BLD AUTO: 10.4 FL (ref 7–12)
POTASSIUM SERPL-SCNC: 3.9 MMOL/L (ref 3.5–5)
RBC # BLD: 3.83 E12/L (ref 3.5–5.5)
SODIUM BLD-SCNC: 139 MMOL/L (ref 132–146)
TOTAL PROTEIN: 6.2 G/DL (ref 6.4–8.3)
WBC # BLD: 4.1 E9/L (ref 4.5–11.5)

## 2018-08-20 PROCEDURE — 85025 COMPLETE CBC W/AUTO DIFF WBC: CPT

## 2018-08-20 PROCEDURE — 6360000002 HC RX W HCPCS: Performed by: FAMILY MEDICINE

## 2018-08-20 PROCEDURE — 6360000004 HC RX CONTRAST MEDICATION: Performed by: RADIOLOGY

## 2018-08-20 PROCEDURE — 74183 MRI ABD W/O CNTR FLWD CNTR: CPT

## 2018-08-20 PROCEDURE — A9541 TC99M SULFUR COLLOID: HCPCS | Performed by: RADIOLOGY

## 2018-08-20 PROCEDURE — 1200000000 HC SEMI PRIVATE

## 2018-08-20 PROCEDURE — 80053 COMPREHEN METABOLIC PANEL: CPT

## 2018-08-20 PROCEDURE — 36415 COLL VENOUS BLD VENIPUNCTURE: CPT

## 2018-08-20 PROCEDURE — 78264 GASTRIC EMPTYING IMG STUDY: CPT

## 2018-08-20 PROCEDURE — A9577 INJ MULTIHANCE: HCPCS | Performed by: RADIOLOGY

## 2018-08-20 PROCEDURE — 3430000000 HC RX DIAGNOSTIC RADIOPHARMACEUTICAL: Performed by: RADIOLOGY

## 2018-08-20 PROCEDURE — 6370000000 HC RX 637 (ALT 250 FOR IP): Performed by: FAMILY MEDICINE

## 2018-08-20 RX ADMIN — CARVEDILOL 25 MG: 25 TABLET, FILM COATED ORAL at 10:23

## 2018-08-20 RX ADMIN — Medication 2 MILLICURIE: at 08:06

## 2018-08-20 RX ADMIN — MORPHINE SULFATE 5 MG: 10 INJECTION INTRAVENOUS at 07:15

## 2018-08-20 RX ADMIN — GABAPENTIN 100 MG: 100 CAPSULE ORAL at 19:24

## 2018-08-20 RX ADMIN — CARVEDILOL 25 MG: 25 TABLET, FILM COATED ORAL at 19:24

## 2018-08-20 RX ADMIN — MORPHINE SULFATE 5 MG: 10 INJECTION INTRAVENOUS at 00:36

## 2018-08-20 RX ADMIN — GABAPENTIN 100 MG: 100 CAPSULE ORAL at 10:23

## 2018-08-20 RX ADMIN — ONDANSETRON 4 MG: 2 INJECTION INTRAMUSCULAR; INTRAVENOUS at 21:09

## 2018-08-20 RX ADMIN — OXYCODONE HYDROCHLORIDE 15 MG: 15 TABLET ORAL at 16:29

## 2018-08-20 RX ADMIN — GADOBENATE DIMEGLUMINE 20 ML: 529 INJECTION, SOLUTION INTRAVENOUS at 14:14

## 2018-08-20 RX ADMIN — OXYCODONE HYDROCHLORIDE 15 MG: 15 TABLET ORAL at 10:23

## 2018-08-20 RX ADMIN — MORPHINE SULFATE 5 MG: 10 INJECTION INTRAVENOUS at 13:48

## 2018-08-20 RX ADMIN — MORPHINE SULFATE 5 MG: 10 INJECTION INTRAVENOUS at 21:09

## 2018-08-20 ASSESSMENT — ENCOUNTER SYMPTOMS
SHORTNESS OF BREATH: 0
COUGH: 0
VOMITING: 0
NAUSEA: 1
DIARRHEA: 0

## 2018-08-20 ASSESSMENT — PAIN SCALES - GENERAL
PAINLEVEL_OUTOF10: 5
PAINLEVEL_OUTOF10: 3
PAINLEVEL_OUTOF10: 7
PAINLEVEL_OUTOF10: 2
PAINLEVEL_OUTOF10: 7
PAINLEVEL_OUTOF10: 5
PAINLEVEL_OUTOF10: 3
PAINLEVEL_OUTOF10: 8
PAINLEVEL_OUTOF10: 9
PAINLEVEL_OUTOF10: 6

## 2018-08-20 ASSESSMENT — PAIN DESCRIPTION - LOCATION
LOCATION: GENERALIZED
LOCATION: GENERALIZED
LOCATION: ABDOMEN

## 2018-08-20 ASSESSMENT — PAIN DESCRIPTION - DESCRIPTORS
DESCRIPTORS: ACHING;DISCOMFORT
DESCRIPTORS: ACHING;CRAMPING;DISCOMFORT

## 2018-08-20 ASSESSMENT — PAIN DESCRIPTION - FREQUENCY
FREQUENCY: CONTINUOUS
FREQUENCY: CONTINUOUS

## 2018-08-20 ASSESSMENT — PAIN DESCRIPTION - PROGRESSION: CLINICAL_PROGRESSION: NOT CHANGED

## 2018-08-20 ASSESSMENT — PAIN DESCRIPTION - ORIENTATION: ORIENTATION: OTHER (COMMENT)

## 2018-08-20 ASSESSMENT — PAIN DESCRIPTION - PAIN TYPE
TYPE: ACUTE PAIN

## 2018-08-20 ASSESSMENT — PAIN DESCRIPTION - ONSET: ONSET: ON-GOING

## 2018-08-20 NOTE — PROGRESS NOTES
Patient seen for follow-up of bilateral foot pain. X-rays of the lower back to confirm presence of past surgery along with abnormal findings of the spine. Patient has also been ordered to undergo an EMG with nerve conduction and this test as yet to be performed. At this point did inform the patient that I firmly believe that her bilateral nerve pain is due to impingement lower back. In order for me to confirm or deny this concern the EMG nerve conduction test is important. At this point we will continue pain management and current treatment protocols. Patient to follow-up daily once EMG nerve conduction tests have been completed. Time was spent in front of the patient to discuss treatment plans and protocol. Problem: Patient Care Overview  Goal: Plan of Care Review    Recommendations    Recommendation/Intervention:     1. Continue dysphagia pureed diet with thin liquids.  2. If po intake <50%, recommend adding boost glucose control with meals.   3. RD following.    Goals: Meet % EEN, EPN  Nutrition Goal Status: goal met

## 2018-08-21 ENCOUNTER — APPOINTMENT (OUTPATIENT)
Dept: ULTRASOUND IMAGING | Age: 58
DRG: 392 | End: 2018-08-21
Attending: FAMILY MEDICINE
Payer: COMMERCIAL

## 2018-08-21 LAB
AFP-TUMOR MARKER: 3 NG/ML (ref 0–9)
CA 19-9: <1 U/ML (ref 0–37)

## 2018-08-21 PROCEDURE — 76705 ECHO EXAM OF ABDOMEN: CPT

## 2018-08-21 PROCEDURE — 6370000000 HC RX 637 (ALT 250 FOR IP): Performed by: INTERNAL MEDICINE

## 2018-08-21 PROCEDURE — 6370000000 HC RX 637 (ALT 250 FOR IP): Performed by: FAMILY MEDICINE

## 2018-08-21 PROCEDURE — 6360000002 HC RX W HCPCS: Performed by: FAMILY MEDICINE

## 2018-08-21 PROCEDURE — 1200000000 HC SEMI PRIVATE

## 2018-08-21 RX ADMIN — MORPHINE SULFATE 5 MG: 10 INJECTION INTRAVENOUS at 16:27

## 2018-08-21 RX ADMIN — GABAPENTIN 100 MG: 100 CAPSULE ORAL at 08:19

## 2018-08-21 RX ADMIN — OXYCODONE HYDROCHLORIDE 15 MG: 15 TABLET ORAL at 13:31

## 2018-08-21 RX ADMIN — MORPHINE SULFATE 5 MG: 10 INJECTION INTRAVENOUS at 06:47

## 2018-08-21 RX ADMIN — PANTOPRAZOLE SODIUM 40 MG: 40 TABLET, DELAYED RELEASE ORAL at 06:40

## 2018-08-21 RX ADMIN — OXYCODONE HYDROCHLORIDE 15 MG: 15 TABLET ORAL at 20:01

## 2018-08-21 RX ADMIN — MORPHINE SULFATE 5 MG: 10 INJECTION INTRAVENOUS at 23:48

## 2018-08-21 RX ADMIN — GABAPENTIN 100 MG: 100 CAPSULE ORAL at 20:01

## 2018-08-21 RX ADMIN — CARVEDILOL 25 MG: 25 TABLET, FILM COATED ORAL at 16:27

## 2018-08-21 RX ADMIN — CARVEDILOL 25 MG: 25 TABLET, FILM COATED ORAL at 08:19

## 2018-08-21 ASSESSMENT — ENCOUNTER SYMPTOMS
NAUSEA: 1
SHORTNESS OF BREATH: 0
DIARRHEA: 0
VOMITING: 0
COUGH: 0

## 2018-08-21 ASSESSMENT — PAIN DESCRIPTION - DESCRIPTORS
DESCRIPTORS: ACHING;DISCOMFORT;CONSTANT
DESCRIPTORS: ACHING;DISCOMFORT;SORE
DESCRIPTORS: ACHING;DISCOMFORT;PINS AND NEEDLES

## 2018-08-21 ASSESSMENT — PAIN DESCRIPTION - ONSET
ONSET: ON-GOING

## 2018-08-21 ASSESSMENT — PAIN SCALES - GENERAL
PAINLEVEL_OUTOF10: 7
PAINLEVEL_OUTOF10: 8
PAINLEVEL_OUTOF10: 2
PAINLEVEL_OUTOF10: 8
PAINLEVEL_OUTOF10: 6
PAINLEVEL_OUTOF10: 5
PAINLEVEL_OUTOF10: 7
PAINLEVEL_OUTOF10: 7
PAINLEVEL_OUTOF10: 2
PAINLEVEL_OUTOF10: 8

## 2018-08-21 ASSESSMENT — PAIN DESCRIPTION - ORIENTATION
ORIENTATION: ANTERIOR
ORIENTATION: ANTERIOR;POSTERIOR

## 2018-08-21 ASSESSMENT — PAIN DESCRIPTION - PAIN TYPE
TYPE: ACUTE PAIN;CHRONIC PAIN
TYPE: ACUTE PAIN
TYPE: ACUTE PAIN

## 2018-08-21 ASSESSMENT — PAIN DESCRIPTION - FREQUENCY
FREQUENCY: CONTINUOUS

## 2018-08-21 ASSESSMENT — PAIN DESCRIPTION - PROGRESSION
CLINICAL_PROGRESSION: GRADUALLY WORSENING
CLINICAL_PROGRESSION: NOT CHANGED
CLINICAL_PROGRESSION: GRADUALLY WORSENING

## 2018-08-21 ASSESSMENT — PAIN DESCRIPTION - LOCATION
LOCATION: GENERALIZED
LOCATION: GENERALIZED
LOCATION: ABDOMEN

## 2018-08-21 NOTE — PROGRESS NOTES
PROGRESS NOTE  By Silvestre Yoder M.D. [facility]  Dr. Saul Vinson M.D.,  Dr. Frank Colon M.D.,   CALI GibbsO.,  Dr. Tania Thrasher M.D.,  Dr Shobha Arguelles D.O. Anca Mahoney  60 y.o.  female    SUBJECTIVE:  Improved but persistent abdominal pain in the epigastrium. Appeared to be worse with meals. OBJECTIVE:    BP (!) 149/71   Pulse 69   Temp 98.3 °F (36.8 °C) (Oral)   Resp 16   Ht 5' 3\" (1.6 m)   Wt 192 lb 6.4 oz (87.3 kg)   LMP 03/01/2006   SpO2 97%   BMI 34.08 kg/m²     General:NAD/-American female  HEENT: anicteric sclerae/moist oromucosa  Neck: Supple with trachea midline  Chest:Symmetrical excursions/nonlabored respirations  Abd.:Soft and obese. Appear mildly tender in the epigastrium. Extr.:No significant peripheral edema  Skin:Warm and dry      DATA:    MRI - pending report       Stool (measured) : 0 mL  Lab Results   Component Value Date    WBC 4.1 08/20/2018    RBC 3.83 08/20/2018    HGB 10.9 08/20/2018    HCT 34.5 08/20/2018    MCV 90.1 08/20/2018    MCH 28.5 08/20/2018    MCHC 31.6 08/20/2018    RDW 13.1 08/20/2018     08/20/2018    MPV 10.4 08/20/2018     Lab Results   Component Value Date     08/20/2018    K 3.9 08/20/2018     08/20/2018    CO2 21 08/20/2018    BUN 11 08/20/2018    CREATININE 1.2 08/20/2018    CALCIUM 8.0 08/20/2018    PROT 6.2 08/20/2018    LABALBU 3.5 08/20/2018    LABALBU 3.8 08/30/2011    BILITOT 0.2 08/20/2018    ALKPHOS 92 08/20/2018    AST 21 08/20/2018    ALT 15 08/20/2018     Lab Results   Component Value Date    LIPASE 22 08/19/2018     Lab Results   Component Value Date    AMYLASE 37 08/18/2018         ASSESSMENT/PLAN:  1.  Abdominal pain - Epigastric pain.    - CT negative  - Pancreatic enzymes normal  - EGD grossly normal.   PPI daily  - GES Shows rapid gastric emptying  - Pain management per admitting.     2.  Diarrhea - Onset 3-4 days ago.  Improved.     - Pending stool studies. Noel Mendez

## 2018-08-21 NOTE — PROGRESS NOTES
Impaired due to weakness. Pain:  She complains of pain that is severe. Pain is requiring pain medication. Objective:  General Appearance:  Comfortable. Vital signs: (most recent): Blood pressure (!) 144/64, pulse 70, temperature 97.8 °F (36.6 °C), temperature source Oral, resp. rate 18, height 5' 3\" (1.6 m), weight 192 lb 6.4 oz (87.3 kg), last menstrual period 03/01/2006, SpO2 99 %, not currently breastfeeding. Vital signs are normal.    Output: Producing urine. HEENT: Normal HEENT exam.    Lungs:  Normal effort. Heart: Normal rate. Regular rhythm. No murmur. Abdomen: Abdomen is soft and non-distended. Bowel sounds are normal.   There is no epigastric area or suprapubic area tenderness. There is no rebound tenderness. There is no mass. Assessment:  (Principal Problem:    Abdominal pain  Active Problems:    Degenerative arthritis of lumbar spine    Hypertension    Arthritis    MVP (mitral valve prolapse)    COPD exacerbation (HCC)    Gastritis    Abdominal pain, epigastric  Resolved Problems:    * No resolved hospital problems. *   ). Plan:   (Nuclear studt rtt  Iv protonix).        Leland Murillo MD  8/20/2018

## 2018-08-21 NOTE — PROGRESS NOTES
Marianna Shown is a 60 y.o. female patient. Current Facility-Administered Medications   Medication Dose Route Frequency Provider Last Rate Last Dose    morphine (PF) injection 5 mg  5 mg Intravenous Q4H PRN Kavitha Mae MD   5 mg at 08/21/18 0217    Or    morphine (PF) injection 10 mg  10 mg Intravenous Q4H PRN Kavitha Mae MD        pantoprazole (PROTONIX) tablet 40 mg  40 mg Oral QAM AC Vandana Shetty MD   40 mg at 08/21/18 0640    ondansetron (ZOFRAN) injection 4 mg  4 mg Intravenous Q6H PRN Kavitha Mae MD   4 mg at 08/20/18 2109    carvedilol (COREG) tablet 25 mg  25 mg Oral BID WC Kavitha Mae MD   25 mg at 08/21/18 4092    gabapentin (NEURONTIN) capsule 100 mg  100 mg Oral TID Kavitha Mae MD   100 mg at 08/21/18 2367    oxyCODONE (OXY-IR) immediate release tablet 15 mg  15 mg Oral Q4H PRN Kavitha Mae MD   15 mg at 08/20/18 1629    0.45 % sodium chloride infusion   Intravenous Continuous Kavitha Mae  mL/hr at 08/19/18 1715       Allergies   Allergen Reactions    Aspirin Swelling    Prochlorperazine Edisylate Swelling    Tegretol [Carbamazepine] Swelling    Compazine [Prochlorperazine Maleate]      Principal Problem:    Abdominal pain  Active Problems:    Degenerative arthritis of lumbar spine    Hypertension    Arthritis    MVP (mitral valve prolapse)    COPD exacerbation (HCC)    Gastritis    Abdominal pain, epigastric  Resolved Problems:    * No resolved hospital problems. *    Blood pressure (!) 149/71, pulse 69, temperature 98.3 °F (36.8 °C), temperature source Oral, resp. rate 16, height 5' 3\" (1.6 m), weight 192 lb 6.4 oz (87.3 kg), last menstrual period 03/01/2006, SpO2 97 %, not currently breastfeeding. Subjective:  Symptoms:  Stable. She reports malaise, weakness and anxiety. No shortness of breath, cough, chest pain, headache, chest pressure, anorexia or diarrhea. Diet:  Poor intake. She reports  nausea. No vomiting.     Activity level:

## 2018-08-22 ENCOUNTER — APPOINTMENT (OUTPATIENT)
Dept: NUCLEAR MEDICINE | Age: 58
DRG: 392 | End: 2018-08-22
Attending: FAMILY MEDICINE
Payer: COMMERCIAL

## 2018-08-22 LAB — CCP IGG ANTIBODIES: 8 UNITS (ref 0–19)

## 2018-08-22 PROCEDURE — 1200000000 HC SEMI PRIVATE

## 2018-08-22 PROCEDURE — 2580000003 HC RX 258: Performed by: RADIOLOGY

## 2018-08-22 PROCEDURE — 3430000000 HC RX DIAGNOSTIC RADIOPHARMACEUTICAL: Performed by: RADIOLOGY

## 2018-08-22 PROCEDURE — 6360000002 HC RX W HCPCS: Performed by: FAMILY MEDICINE

## 2018-08-22 PROCEDURE — 6370000000 HC RX 637 (ALT 250 FOR IP): Performed by: FAMILY MEDICINE

## 2018-08-22 PROCEDURE — 78226 HEPATOBILIARY SYSTEM IMAGING: CPT

## 2018-08-22 PROCEDURE — 6360000004 HC RX CONTRAST MEDICATION: Performed by: RADIOLOGY

## 2018-08-22 PROCEDURE — 2580000003 HC RX 258: Performed by: FAMILY MEDICINE

## 2018-08-22 PROCEDURE — A9537 TC99M MEBROFENIN: HCPCS | Performed by: RADIOLOGY

## 2018-08-22 RX ORDER — PANTOPRAZOLE SODIUM 40 MG/1
40 TABLET, DELAYED RELEASE ORAL
Qty: 30 TABLET | Refills: 3 | Status: SHIPPED | OUTPATIENT
Start: 2018-08-23

## 2018-08-22 RX ADMIN — SODIUM CHLORIDE: 4.5 INJECTION, SOLUTION INTRAVENOUS at 14:53

## 2018-08-22 RX ADMIN — CARVEDILOL 25 MG: 25 TABLET, FILM COATED ORAL at 17:01

## 2018-08-22 RX ADMIN — SODIUM CHLORIDE 1.75 MCG: 9 INJECTION, SOLUTION INTRAVENOUS at 12:13

## 2018-08-22 RX ADMIN — OXYCODONE HYDROCHLORIDE 15 MG: 15 TABLET ORAL at 13:50

## 2018-08-22 RX ADMIN — MEBROFENIN 6 MILLICURIE: 45 INJECTION, POWDER, LYOPHILIZED, FOR SOLUTION INTRAVENOUS at 11:25

## 2018-08-22 RX ADMIN — MORPHINE SULFATE 5 MG: 10 INJECTION INTRAVENOUS at 17:02

## 2018-08-22 RX ADMIN — MORPHINE SULFATE 5 MG: 10 INJECTION INTRAVENOUS at 06:48

## 2018-08-22 RX ADMIN — GABAPENTIN 100 MG: 100 CAPSULE ORAL at 20:49

## 2018-08-22 ASSESSMENT — ENCOUNTER SYMPTOMS
SHORTNESS OF BREATH: 0
VOMITING: 0
COUGH: 0
DIARRHEA: 0
NAUSEA: 1

## 2018-08-22 ASSESSMENT — PAIN DESCRIPTION - LOCATION
LOCATION: ABDOMEN
LOCATION: ABDOMEN;GENERALIZED

## 2018-08-22 ASSESSMENT — PAIN SCALES - GENERAL
PAINLEVEL_OUTOF10: 8
PAINLEVEL_OUTOF10: 7
PAINLEVEL_OUTOF10: 8
PAINLEVEL_OUTOF10: 7
PAINLEVEL_OUTOF10: 0

## 2018-08-22 ASSESSMENT — PAIN DESCRIPTION - PAIN TYPE
TYPE: ACUTE PAIN;CHRONIC PAIN
TYPE: ACUTE PAIN

## 2018-08-22 ASSESSMENT — PAIN DESCRIPTION - ORIENTATION: ORIENTATION: MID;UPPER

## 2018-08-22 ASSESSMENT — PAIN DESCRIPTION - PROGRESSION: CLINICAL_PROGRESSION: NOT CHANGED

## 2018-08-22 ASSESSMENT — PAIN DESCRIPTION - DESCRIPTORS
DESCRIPTORS: ACHING;SORE;SHARP
DESCRIPTORS: ACHING;DISCOMFORT;SORE;THROBBING

## 2018-08-22 ASSESSMENT — PAIN DESCRIPTION - FREQUENCY: FREQUENCY: CONTINUOUS

## 2018-08-22 ASSESSMENT — PAIN DESCRIPTION - ONSET: ONSET: ON-GOING

## 2018-08-22 NOTE — PROGRESS NOTES
P Quality Flow/Interdisciplinary Rounds Progress Note        Quality Flow Rounds held on August 22, 2018    Disciplines Attending:  Bedside Nurse, ,  and Nursing Unit [address] was admitted on 8/17/2018  8:18 PM    Anticipated Discharge Date:  Expected Discharge Date: 08/20/18    Disposition:    Esequiel Score:  Esequiel Scale Score: 20    Readmission Risk              Risk of Unplanned Readmission:        9             Discussed patient goal for the day, patient clinical progression, and barriers to discharge. The following Goal(s) of the Day/Commitment(s) have been identified:   Follow up with AMMY Sanabria  August 22, 2018

## 2018-08-22 NOTE — PROGRESS NOTES
Carmen Escobar is a 60 y.o. female patient. Current Facility-Administered Medications   Medication Dose Route Frequency Provider Last Rate Last Dose    technetium mebrofenin (CHOLETEC) injection 6 millicurie  6 millicurie Intravenous ONCE PRN Augie Freeman MD        morphine (PF) injection 5 mg  5 mg Intravenous Q4H PRN Isatu Sanches MD   5 mg at 08/22/18 9313    Or    morphine (PF) injection 10 mg  10 mg Intravenous Q4H PRN Isatu Sanches MD        pantoprazole (PROTONIX) tablet 40 mg  40 mg Oral QAM AC Nas Colmenares MD   40 mg at 08/21/18 0640    ondansetron (ZOFRAN) injection 4 mg  4 mg Intravenous Q6H PRN Isatu Sanches MD   4 mg at 08/20/18 2109    carvedilol (COREG) tablet 25 mg  25 mg Oral BID WC Isatu Sanches MD   25 mg at 08/21/18 1627    gabapentin (NEURONTIN) capsule 100 mg  100 mg Oral TID Isatu Sanches MD   100 mg at 08/21/18 2001    oxyCODONE (OXY-IR) immediate release tablet 15 mg  15 mg Oral Q4H PRN Isatu Sanches MD   15 mg at 08/21/18 2001    0.45 % sodium chloride infusion   Intravenous Continuous Isatu Sanches  mL/hr at 08/19/18 1715       Allergies   Allergen Reactions    Aspirin Swelling    Prochlorperazine Edisylate Swelling    Tegretol [Carbamazepine] Swelling    Compazine [Prochlorperazine Maleate]      Principal Problem:    Abdominal pain  Active Problems:    Degenerative arthritis of lumbar spine    Hypertension    Arthritis    MVP (mitral valve prolapse)    COPD exacerbation (HCC)    Gastritis    Abdominal pain, epigastric  Resolved Problems:    * No resolved hospital problems. *    Blood pressure (!) 180/85, pulse 57, temperature 97.8 °F (36.6 °C), temperature source Oral, resp. rate 16, height 5' 3\" (1.6 m), weight 192 lb 6.4 oz (87.3 kg), last menstrual period 03/01/2006, SpO2 100 %, not currently breastfeeding. Subjective:  Symptoms:  Stable. She reports malaise, weakness and anxiety.   No shortness of breath, cough, chest pain, headache,

## 2018-08-23 VITALS
HEART RATE: 67 BPM | TEMPERATURE: 98.4 F | BODY MASS INDEX: 34.09 KG/M2 | HEIGHT: 63 IN | DIASTOLIC BLOOD PRESSURE: 62 MMHG | RESPIRATION RATE: 20 BRPM | OXYGEN SATURATION: 99 % | SYSTOLIC BLOOD PRESSURE: 130 MMHG | WEIGHT: 192.4 LBS

## 2018-08-23 PROCEDURE — 87425 ROTAVIRUS AG IA: CPT

## 2018-08-23 PROCEDURE — 2580000003 HC RX 258

## 2018-08-23 PROCEDURE — 6370000000 HC RX 637 (ALT 250 FOR IP): Performed by: INTERNAL MEDICINE

## 2018-08-23 PROCEDURE — 6370000000 HC RX 637 (ALT 250 FOR IP): Performed by: FAMILY MEDICINE

## 2018-08-23 PROCEDURE — 87077 CULTURE AEROBIC IDENTIFY: CPT

## 2018-08-23 PROCEDURE — 82705 FATS/LIPIDS FECES QUAL: CPT

## 2018-08-23 PROCEDURE — 87329 GIARDIA AG IA: CPT

## 2018-08-23 PROCEDURE — 87045 FECES CULTURE AEROBIC BACT: CPT

## 2018-08-23 PROCEDURE — 87328 CRYPTOSPORIDIUM AG IA: CPT

## 2018-08-23 PROCEDURE — 6360000002 HC RX W HCPCS: Performed by: FAMILY MEDICINE

## 2018-08-23 PROCEDURE — 87186 SC STD MICRODIL/AGAR DIL: CPT

## 2018-08-23 RX ORDER — OXYCODONE HYDROCHLORIDE 10 MG/1
10 TABLET ORAL EVERY 6 HOURS PRN
Qty: 6 TABLET | Refills: 0
Start: 2018-08-23 | End: 2018-09-22

## 2018-08-23 RX ORDER — SODIUM CHLORIDE 0.9 % (FLUSH) 0.9 %
SYRINGE (ML) INJECTION
Status: COMPLETED
Start: 2018-08-23 | End: 2018-08-23

## 2018-08-23 RX ADMIN — OXYCODONE HYDROCHLORIDE 15 MG: 15 TABLET ORAL at 05:05

## 2018-08-23 RX ADMIN — PANTOPRAZOLE SODIUM 40 MG: 40 TABLET, DELAYED RELEASE ORAL at 05:03

## 2018-08-23 RX ADMIN — GABAPENTIN 100 MG: 100 CAPSULE ORAL at 08:36

## 2018-08-23 RX ADMIN — OXYCODONE HYDROCHLORIDE 15 MG: 15 TABLET ORAL at 11:59

## 2018-08-23 RX ADMIN — CARVEDILOL 25 MG: 25 TABLET, FILM COATED ORAL at 08:36

## 2018-08-23 RX ADMIN — Medication 10 ML: at 01:14

## 2018-08-23 RX ADMIN — MORPHINE SULFATE 5 MG: 10 INJECTION INTRAVENOUS at 09:05

## 2018-08-23 RX ADMIN — MORPHINE SULFATE 5 MG: 10 INJECTION INTRAVENOUS at 01:14

## 2018-08-23 ASSESSMENT — PAIN DESCRIPTION - LOCATION
LOCATION: ABDOMEN

## 2018-08-23 ASSESSMENT — PAIN SCALES - GENERAL
PAINLEVEL_OUTOF10: 8

## 2018-08-23 ASSESSMENT — PAIN DESCRIPTION - PROGRESSION: CLINICAL_PROGRESSION: GRADUALLY WORSENING

## 2018-08-23 ASSESSMENT — PAIN DESCRIPTION - ORIENTATION
ORIENTATION: MID;UPPER;RIGHT
ORIENTATION: RIGHT;MID;UPPER
ORIENTATION: RIGHT;LOWER;MID

## 2018-08-23 ASSESSMENT — PAIN DESCRIPTION - PAIN TYPE
TYPE: ACUTE PAIN

## 2018-08-23 ASSESSMENT — ENCOUNTER SYMPTOMS
SHORTNESS OF BREATH: 0
NAUSEA: 0
VOMITING: 0

## 2018-08-23 ASSESSMENT — PAIN DESCRIPTION - DIRECTION: RADIATING_TOWARDS: RT FLANK

## 2018-08-23 ASSESSMENT — PAIN DESCRIPTION - DESCRIPTORS
DESCRIPTORS: ACHING;CONSTANT;DISCOMFORT
DESCRIPTORS: ACHING;CONSTANT;DISCOMFORT
DESCRIPTORS: ACHING;DISCOMFORT;DULL

## 2018-08-23 ASSESSMENT — PAIN DESCRIPTION - FREQUENCY
FREQUENCY: CONTINUOUS

## 2018-08-23 NOTE — PROGRESS NOTES
anorexia. Diet:  Adequate intake. No nausea or vomiting. Activity level: Impaired due to weakness. Pain:  She complains of pain that is severe. Pain is requiring pain medication. Objective:  General Appearance:  Comfortable. Vital signs: (most recent): Blood pressure 130/62, pulse 67, temperature 98.4 °F (36.9 °C), temperature source Oral, resp. rate 20, height 5' 3\" (1.6 m), weight 192 lb 6.4 oz (87.3 kg), last menstrual period 03/01/2006, SpO2 99 %, not currently breastfeeding. Vital signs are normal.  No fever. Output: Producing urine. HEENT: Normal HEENT exam.    Lungs:  Normal effort and normal respiratory rate. There are decreased breath sounds. No rales, wheezes or rhonchi. Heart: Normal rate. Regular rhythm. No murmur. Abdomen: Abdomen is soft. Bowel sounds are normal.   There is generalized tenderness. There is epigastric tenderness. There is rebound tenderness. There is no guarding. There is no mass. There is no splenomegaly. There is no hepatomegaly. Extremities: There is no deformity, dependent edema or local swelling. Assessment:  (Principal Problem:    Abdominal pain  Active Problems:    Degenerative arthritis of lumbar spine    Hypertension    Arthritis    MVP (mitral valve prolapse)    COPD exacerbation (HCC)    Gastritis    Abdominal pain, epigastric  Resolved Problems:    * No resolved hospital problems. *   ). Plan:   (Continue treatment).        Kelly Alston MD  8/23/2018

## 2018-08-24 LAB
CRYPTOSPORIDIUM ANTIGEN STOOL: NORMAL
FECAL NEUTRAL FAT: NORMAL
FECAL SPLIT FATS: ABNORMAL
GIARDIA ANTIGEN STOOL: NORMAL
ROTAVIRUS ANTIGEN: NORMAL

## 2018-08-25 LAB — CULTURE, STOOL: NORMAL

## 2018-10-02 NOTE — DISCHARGE SUMMARY
[address]                                 DISCHARGE SUMMARY    PATIENT NAME: Tanvi Piper              :        1960  MED REC NO:   [id number]                            ROOM:       0315  ACCOUNT NO:   [de-identified]                           ADMIT DATE: 2018  PROVIDER:     Jennifer Morrissey MD                  DISCHARGE DATE: 2018    HOSPITAL COURSE:  This patient is a 50-year-old black female, who had come  with past medical history of gastritis, hypertension, arthritis of lumbar  spine, who presents to the hospital secondary to severe midepigastric  abdominal pain. The pain is described as an excruciating sensation  associated with nausea and vomiting and diffuse weakness. The patient  denied any chest pain, fever, or chills. ADMISSION DIAGNOSES:  The patient was admitted to the hospital with the  diagnoses of:  1. Acute abdominal pain - gastritis. 2.  Intractable nausea. 3.  Hypertension. 4.  Arthritis of the knee. 5.  Arthritis of lumbar spine. The patient was admitted, IV Protonix was started, CT scan of the abdomen  and pelvis was obtained; GI was consulted for possible endoscopy secondary  to the patient's severity of symptoms. The patient had an upper endoscopy  performed, which was consistent with acute gastritis. The CT scan of the  abdomen revealed enhancing lesion in the right lobe of the liver. An MRI  of the liver was recommended. X-rays of the lumbar spine revealed  postoperative spondylotic changes. The patient is also complaining of  severe left foot pain, and actually the left foot was with no acute  abnormalities. The patient's pancreatic enzymes normalized, and  consideration was given towards gastroparesis, thus apparently a gastric  emptying study was ordered. The gastric emptying study actually revealed  an increased transit time.   The patient's CEA was normal.  Thus, the  patient was monitored very closely. The patient was seen by Dr. _____  secondary to foot pain. Dr. _____ thought that the foot pain is due to  bilateral nerve severe impingement in the lower back. The patient was  monitored very closely. IV Protonix was continued, and the patient did  improve. Ultrasound of the gallbladder was obtained, which was within  normal limits. Also, CCK with HIDA scan was also obtained, which revealed  a normal ejection fraction of the gallbladder. The decision was made to  discharge the patient from the hospital on 2018. The abdominal pain  was improved, but was still present at the time of the discharge _____  outside hospital.  An MRI was recently obtained, there is no definitive  lesion seen. We also recommended repeat MRI in six months. The patient  was discharged from the hospital in stable condition. DISCHARGE DIAGNOSES:  The patient's diagnoses at the time of discharge were  the followin. Acute abdominal pain - gastritis. 2.  Degenerative arthritis of lumbar spine. 3.  Hypertension. 4.  Mitral valve prolapse. 5.  COPD. 6.  Hepatic lesion per CAT scan. The patient was discharged from the hospital in stable condition,  instructed to seek followup medical attention with Dr. Prince Butt in 2 to 3 days. Medications at the time of discharge can be found per the med  reconciliation sheet.         Nanette Davies MD    D: 10/01/2018 2:49:53       T: 10/01/2018 19:30:33     [email or web address]  Job#: [id number]     Doc#: [id number]    CC:

## 2018-10-19 ENCOUNTER — HOSPITAL ENCOUNTER (INPATIENT)
Age: 58
LOS: 1 days | Discharge: HOME | DRG: 311 | End: 2018-10-22
Attending: EMERGENCY MEDICINE | Admitting: FAMILY MEDICINE
Payer: COMMERCIAL

## 2018-10-19 ENCOUNTER — APPOINTMENT (OUTPATIENT)
Dept: GENERAL RADIOLOGY | Age: 58
DRG: 311 | End: 2018-10-19
Payer: COMMERCIAL

## 2018-10-19 DIAGNOSIS — R07.89 OTHER CHEST PAIN: Primary | ICD-10-CM

## 2018-10-19 PROBLEM — R07.9 CHEST PAIN: Status: ACTIVE | Noted: 2018-10-19

## 2018-10-19 LAB
ALBUMIN SERPL-MCNC: 3.9 G/DL (ref 3.5–5.2)
ALP BLD-CCNC: 79 U/L (ref 35–104)
ALT SERPL-CCNC: 13 U/L (ref 0–32)
ANION GAP SERPL CALCULATED.3IONS-SCNC: 11 MMOL/L (ref 7–16)
AST SERPL-CCNC: 21 U/L (ref 0–31)
BASOPHILS ABSOLUTE: 0.01 E9/L (ref 0–0.2)
BASOPHILS RELATIVE PERCENT: 0.2 % (ref 0–2)
BILIRUB SERPL-MCNC: 0.3 MG/DL (ref 0–1.2)
BUN BLDV-MCNC: 14 MG/DL (ref 6–20)
CALCIUM SERPL-MCNC: 8.7 MG/DL (ref 8.6–10.2)
CHLORIDE BLD-SCNC: 105 MMOL/L (ref 98–107)
CO2: 25 MMOL/L (ref 22–29)
CREAT SERPL-MCNC: 1.2 MG/DL (ref 0.5–1)
EKG ATRIAL RATE: 85 BPM
EKG P AXIS: 71 DEGREES
EKG P-R INTERVAL: 250 MS
EKG Q-T INTERVAL: 412 MS
EKG QRS DURATION: 78 MS
EKG QTC CALCULATION (BAZETT): 490 MS
EKG R AXIS: -19 DEGREES
EKG T AXIS: 22 DEGREES
EKG VENTRICULAR RATE: 85 BPM
EOSINOPHILS ABSOLUTE: 0.07 E9/L (ref 0.05–0.5)
EOSINOPHILS RELATIVE PERCENT: 1.5 % (ref 0–6)
GFR AFRICAN AMERICAN: 56
GFR NON-AFRICAN AMERICAN: 56 ML/MIN/1.73
GLUCOSE BLD-MCNC: 97 MG/DL (ref 74–109)
HCT VFR BLD CALC: 35.7 % (ref 34–48)
HEMOGLOBIN: 11.2 G/DL (ref 11.5–15.5)
IMMATURE GRANULOCYTES #: 0 E9/L
IMMATURE GRANULOCYTES %: 0 % (ref 0–5)
LIPASE: 14 U/L (ref 13–60)
LYMPHOCYTES ABSOLUTE: 2.6 E9/L (ref 1.5–4)
LYMPHOCYTES RELATIVE PERCENT: 55.7 % (ref 20–42)
MCH RBC QN AUTO: 28 PG (ref 26–35)
MCHC RBC AUTO-ENTMCNC: 31.4 % (ref 32–34.5)
MCV RBC AUTO: 89.3 FL (ref 80–99.9)
MONOCYTES ABSOLUTE: 0.36 E9/L (ref 0.1–0.95)
MONOCYTES RELATIVE PERCENT: 7.7 % (ref 2–12)
NEUTROPHILS ABSOLUTE: 1.63 E9/L (ref 1.8–7.3)
NEUTROPHILS RELATIVE PERCENT: 34.9 % (ref 43–80)
PDW BLD-RTO: 13.6 FL (ref 11.5–15)
PLATELET # BLD: 190 E9/L (ref 130–450)
PMV BLD AUTO: 10.5 FL (ref 7–12)
POTASSIUM SERPL-SCNC: 3.5 MMOL/L (ref 3.5–5)
RBC # BLD: 4 E12/L (ref 3.5–5.5)
SODIUM BLD-SCNC: 141 MMOL/L (ref 132–146)
TOTAL PROTEIN: 6.8 G/DL (ref 6.4–8.3)
TROPONIN: <0.01 NG/ML (ref 0–0.03)
WBC # BLD: 4.7 E9/L (ref 4.5–11.5)

## 2018-10-19 PROCEDURE — G0378 HOSPITAL OBSERVATION PER HR: HCPCS

## 2018-10-19 PROCEDURE — 99285 EMERGENCY DEPT VISIT HI MDM: CPT

## 2018-10-19 PROCEDURE — 93005 ELECTROCARDIOGRAM TRACING: CPT | Performed by: EMERGENCY MEDICINE

## 2018-10-19 PROCEDURE — 36415 COLL VENOUS BLD VENIPUNCTURE: CPT

## 2018-10-19 PROCEDURE — 83690 ASSAY OF LIPASE: CPT

## 2018-10-19 PROCEDURE — 96374 THER/PROPH/DIAG INJ IV PUSH: CPT

## 2018-10-19 PROCEDURE — 6370000000 HC RX 637 (ALT 250 FOR IP): Performed by: EMERGENCY MEDICINE

## 2018-10-19 PROCEDURE — 84484 ASSAY OF TROPONIN QUANT: CPT

## 2018-10-19 PROCEDURE — 80053 COMPREHEN METABOLIC PANEL: CPT

## 2018-10-19 PROCEDURE — 6360000002 HC RX W HCPCS: Performed by: EMERGENCY MEDICINE

## 2018-10-19 PROCEDURE — 94761 N-INVAS EAR/PLS OXIMETRY MLT: CPT

## 2018-10-19 PROCEDURE — 85025 COMPLETE CBC W/AUTO DIFF WBC: CPT

## 2018-10-19 PROCEDURE — 71046 X-RAY EXAM CHEST 2 VIEWS: CPT

## 2018-10-19 RX ORDER — AMLODIPINE BESYLATE 5 MG/1
5 TABLET ORAL DAILY
Status: DISCONTINUED | OUTPATIENT
Start: 2018-10-20 | End: 2018-10-22 | Stop reason: HOSPADM

## 2018-10-19 RX ORDER — ACETAMINOPHEN 325 MG/1
650 TABLET ORAL EVERY 4 HOURS PRN
Status: DISCONTINUED | OUTPATIENT
Start: 2018-10-19 | End: 2018-10-22 | Stop reason: HOSPADM

## 2018-10-19 RX ORDER — SUCRALFATE 1 G/1
1 TABLET ORAL 2 TIMES DAILY
Status: DISCONTINUED | OUTPATIENT
Start: 2018-10-20 | End: 2018-10-22 | Stop reason: HOSPADM

## 2018-10-19 RX ORDER — OXYCODONE HYDROCHLORIDE 5 MG/1
10 TABLET ORAL EVERY 6 HOURS PRN
Status: DISCONTINUED | OUTPATIENT
Start: 2018-10-19 | End: 2018-10-22 | Stop reason: HOSPADM

## 2018-10-19 RX ORDER — TIZANIDINE 4 MG/1
2 TABLET ORAL 2 TIMES DAILY PRN
Status: DISCONTINUED | OUTPATIENT
Start: 2018-10-19 | End: 2018-10-22 | Stop reason: HOSPADM

## 2018-10-19 RX ORDER — MORPHINE SULFATE 10 MG/ML
5 INJECTION, SOLUTION INTRAMUSCULAR; INTRAVENOUS ONCE
Status: COMPLETED | OUTPATIENT
Start: 2018-10-19 | End: 2018-10-19

## 2018-10-19 RX ORDER — OXYCODONE HYDROCHLORIDE 10 MG/1
10 TABLET ORAL EVERY 6 HOURS PRN
COMMUNITY
End: 2019-03-21

## 2018-10-19 RX ORDER — CARVEDILOL 25 MG/1
25 TABLET ORAL 2 TIMES DAILY WITH MEALS
Status: DISCONTINUED | OUTPATIENT
Start: 2018-10-20 | End: 2018-10-22 | Stop reason: HOSPADM

## 2018-10-19 RX ORDER — NITROGLYCERIN 0.4 MG/1
0.4 TABLET SUBLINGUAL EVERY 5 MIN PRN
Status: DISCONTINUED | OUTPATIENT
Start: 2018-10-19 | End: 2018-10-22 | Stop reason: HOSPADM

## 2018-10-19 RX ORDER — LOSARTAN POTASSIUM 50 MG/1
100 TABLET ORAL DAILY
Status: DISCONTINUED | OUTPATIENT
Start: 2018-10-20 | End: 2018-10-22 | Stop reason: HOSPADM

## 2018-10-19 RX ORDER — LOSARTAN POTASSIUM AND HYDROCHLOROTHIAZIDE 25; 100 MG/1; MG/1
1 TABLET ORAL DAILY
Status: DISCONTINUED | OUTPATIENT
Start: 2018-10-20 | End: 2018-10-19 | Stop reason: CLARIF

## 2018-10-19 RX ORDER — LOSARTAN POTASSIUM AND HYDROCHLOROTHIAZIDE 25; 100 MG/1; MG/1
1 TABLET ORAL DAILY
COMMUNITY

## 2018-10-19 RX ORDER — SODIUM CHLORIDE 0.9 % (FLUSH) 0.9 %
10 SYRINGE (ML) INJECTION PRN
Status: DISCONTINUED | OUTPATIENT
Start: 2018-10-19 | End: 2018-10-22 | Stop reason: HOSPADM

## 2018-10-19 RX ORDER — MORPHINE SULFATE 2 MG/ML
2 INJECTION, SOLUTION INTRAMUSCULAR; INTRAVENOUS
Status: DISCONTINUED | OUTPATIENT
Start: 2018-10-19 | End: 2018-10-22 | Stop reason: HOSPADM

## 2018-10-19 RX ORDER — PANTOPRAZOLE SODIUM 40 MG/1
40 TABLET, DELAYED RELEASE ORAL
Status: DISCONTINUED | OUTPATIENT
Start: 2018-10-20 | End: 2018-10-22 | Stop reason: HOSPADM

## 2018-10-19 RX ORDER — HYDROCHLOROTHIAZIDE 25 MG/1
25 TABLET ORAL DAILY
Status: DISCONTINUED | OUTPATIENT
Start: 2018-10-20 | End: 2018-10-22 | Stop reason: HOSPADM

## 2018-10-19 RX ORDER — SUCRALFATE 1 G/1
1 TABLET ORAL EVERY 12 HOURS PRN
COMMUNITY

## 2018-10-19 RX ORDER — ACETAMINOPHEN 500 MG
500 TABLET ORAL EVERY 6 HOURS PRN
Status: DISCONTINUED | OUTPATIENT
Start: 2018-10-19 | End: 2018-10-22 | Stop reason: HOSPADM

## 2018-10-19 RX ORDER — ACETAMINOPHEN 500 MG
500 TABLET ORAL EVERY 6 HOURS PRN
Status: ON HOLD | COMMUNITY
End: 2020-10-10 | Stop reason: HOSPADM

## 2018-10-19 RX ORDER — CARVEDILOL 25 MG/1
25 TABLET ORAL 2 TIMES DAILY WITH MEALS
COMMUNITY

## 2018-10-19 RX ORDER — AMLODIPINE BESYLATE 5 MG/1
5 TABLET ORAL DAILY
COMMUNITY

## 2018-10-19 RX ORDER — SODIUM CHLORIDE 0.9 % (FLUSH) 0.9 %
10 SYRINGE (ML) INJECTION EVERY 12 HOURS SCHEDULED
Status: DISCONTINUED | OUTPATIENT
Start: 2018-10-19 | End: 2018-10-22 | Stop reason: HOSPADM

## 2018-10-19 RX ORDER — TIZANIDINE 2 MG/1
2 TABLET ORAL 2 TIMES DAILY PRN
Status: ON HOLD | COMMUNITY
End: 2019-05-20 | Stop reason: ALTCHOICE

## 2018-10-19 RX ADMIN — MORPHINE SULFATE 5 MG: 10 INJECTION INTRAVENOUS at 20:17

## 2018-10-19 RX ADMIN — LIDOCAINE HYDROCHLORIDE: 20 SOLUTION ORAL; TOPICAL at 19:19

## 2018-10-19 ASSESSMENT — PAIN SCALES - GENERAL
PAINLEVEL_OUTOF10: 4
PAINLEVEL_OUTOF10: 8
PAINLEVEL_OUTOF10: 8

## 2018-10-19 ASSESSMENT — PAIN DESCRIPTION - PAIN TYPE
TYPE: ACUTE PAIN
TYPE: ACUTE PAIN

## 2018-10-19 ASSESSMENT — ENCOUNTER SYMPTOMS
SHORTNESS OF BREATH: 0
SORE THROAT: 0
ABDOMINAL PAIN: 0
ABDOMINAL DISTENTION: 0
VOMITING: 0
WHEEZING: 0
SINUS PRESSURE: 0
NAUSEA: 0
DIARRHEA: 0
BACK PAIN: 0
CHEST TIGHTNESS: 1
COUGH: 0

## 2018-10-19 ASSESSMENT — PAIN DESCRIPTION - DESCRIPTORS: DESCRIPTORS: HEAVINESS;DULL

## 2018-10-19 ASSESSMENT — PAIN DESCRIPTION - FREQUENCY: FREQUENCY: INTERMITTENT

## 2018-10-19 ASSESSMENT — PAIN DESCRIPTION - LOCATION
LOCATION: CHEST;ARM
LOCATION: CHEST

## 2018-10-19 ASSESSMENT — PAIN DESCRIPTION - ORIENTATION: ORIENTATION: MID;LEFT

## 2018-10-19 NOTE — ED PROVIDER NOTES
murmur heard. Pulmonary/Chest: Effort normal and breath sounds normal. No accessory muscle usage. No respiratory distress. She has no decreased breath sounds. She has no wheezes. She has no rhonchi. She has no rales. She exhibits tenderness. She exhibits no bony tenderness, no edema and no deformity. Left anterior chest wall, soft tissues all generally tender to palpation and reproduces the symptoms. Abdominal: Soft. Normal appearance and bowel sounds are normal. She exhibits no distension, no ascites, no pulsatile midline mass and no mass. There is no tenderness. There is no rigidity, no rebound, no guarding, no CVA tenderness, no tenderness at McBurney's point and negative Shine's sign. Musculoskeletal: She exhibits tenderness. She exhibits no edema. All for extremities are neurovascular intact with no swelling. She has no pretibial edema or calf pain. She does complain of pain with any of the left shoulder, elbow, humerus or forearm or hand are palpated. There is no appreciable soft tissue swelling or erythema or rash. She has strong bilateral radial pulses. Neurological: She is alert and oriented to person, place, and time. She is not disoriented. She displays no atrophy, no tremor and normal reflexes. No cranial nerve deficit or sensory deficit. She exhibits normal muscle tone. She displays no seizure activity. Coordination and gait normal. GCS eye subscore is 4. GCS verbal subscore is 5. GCS motor subscore is 6. Skin: Skin is warm and dry. She is not diaphoretic. Nursing note and vitals reviewed. Procedures    MDM    ED Course as of Oct 19 2003   Fri Oct 19, 2018   8406 Aspirin not given, patient with swelling reaction to aspirin. [NC]   1948 Case discussed with Dr. Em Light, he knows this patient well, does request the patient be admitted for observation.   [NC]      ED Course User Index  [NC] Surjit Para, DO       EKG Interpretation    Interpreted by emergency department -------------------------------------------------    LABS:  Results for orders placed or performed during the hospital encounter of 10/19/18   CBC Auto Differential   Result Value Ref Range    WBC 4.7 4.5 - 11.5 E9/L    RBC 4.00 3.50 - 5.50 E12/L    Hemoglobin 11.2 (L) 11.5 - 15.5 g/dL    Hematocrit 35.7 34.0 - 48.0 %    MCV 89.3 80.0 - 99.9 fL    MCH 28.0 26.0 - 35.0 pg    MCHC 31.4 (L) 32.0 - 34.5 %    RDW 13.6 11.5 - 15.0 fL    Platelets 344 418 - 047 E9/L    MPV 10.5 7.0 - 12.0 fL    Neutrophils % 34.9 (L) 43.0 - 80.0 %    Immature Granulocytes % 0.0 0.0 - 5.0 %    Lymphocytes % 55.7 (H) 20.0 - 42.0 %    Monocytes % 7.7 2.0 - 12.0 %    Eosinophils % 1.5 0.0 - 6.0 %    Basophils % 0.2 0.0 - 2.0 %    Neutrophils # 1.63 (L) 1.80 - 7.30 E9/L    Immature Granulocytes # 0.00 E9/L    Lymphocytes # 2.60 1.50 - 4.00 E9/L    Monocytes # 0.36 0.10 - 0.95 E9/L    Eosinophils # 0.07 0.05 - 0.50 E9/L    Basophils # 0.01 0.00 - 0.20 E9/L   Comprehensive Metabolic Panel   Result Value Ref Range    Sodium 141 132 - 146 mmol/L    Potassium 3.5 3.5 - 5.0 mmol/L    Chloride 105 98 - 107 mmol/L    CO2 25 22 - 29 mmol/L    Anion Gap 11 7 - 16 mmol/L    Glucose 97 74 - 109 mg/dL    BUN 14 6 - 20 mg/dL    CREATININE 1.2 (H) 0.5 - 1.0 mg/dL    GFR Non-African American 56 >=60 mL/min/1.73    GFR African American 56     Calcium 8.7 8.6 - 10.2 mg/dL    Total Protein 6.8 6.4 - 8.3 g/dL    Alb 3.9 3.5 - 5.2 g/dL    Total Bilirubin 0.3 0.0 - 1.2 mg/dL    Alkaline Phosphatase 79 35 - 104 U/L    ALT 13 0 - 32 U/L    AST 21 0 - 31 U/L   Lipase   Result Value Ref Range    Lipase 14 13 - 60 U/L   Troponin   Result Value Ref Range    Troponin <0.01 0.00 - 0.03 ng/mL       RADIOLOGY:  XR CHEST STANDARD (2 VW)   Final Result   1. No acute cardiopulmonary disease, without acute infiltrate or   significant change since 8/18/2018.    2. Minimal right basilar linear scar                    ------------------------- NURSING NOTES AND VITALS REVIEWED

## 2018-10-19 NOTE — ED NOTES
Bed: 10  Expected date:   Expected time:   Means of arrival:   Comments:  Triage chest pain     Pilo Huerta RN  10/19/18 8637

## 2018-10-20 ENCOUNTER — APPOINTMENT (OUTPATIENT)
Dept: NUCLEAR MEDICINE | Age: 58
DRG: 311 | End: 2018-10-20
Payer: COMMERCIAL

## 2018-10-20 ENCOUNTER — APPOINTMENT (OUTPATIENT)
Dept: CT IMAGING | Age: 58
DRG: 311 | End: 2018-10-20
Payer: COMMERCIAL

## 2018-10-20 LAB
ALBUMIN SERPL-MCNC: 3.8 G/DL (ref 3.5–5.2)
ALP BLD-CCNC: 79 U/L (ref 35–104)
ALT SERPL-CCNC: 11 U/L (ref 0–32)
ANION GAP SERPL CALCULATED.3IONS-SCNC: 8 MMOL/L (ref 7–16)
AST SERPL-CCNC: 20 U/L (ref 0–31)
BASOPHILS ABSOLUTE: 0.01 E9/L (ref 0–0.2)
BASOPHILS RELATIVE PERCENT: 0.3 % (ref 0–2)
BILIRUB SERPL-MCNC: 0.4 MG/DL (ref 0–1.2)
BUN BLDV-MCNC: 11 MG/DL (ref 6–20)
CALCIUM SERPL-MCNC: 8.9 MG/DL (ref 8.6–10.2)
CHLORIDE BLD-SCNC: 107 MMOL/L (ref 98–107)
CHOLESTEROL, TOTAL: 211 MG/DL (ref 0–199)
CK MB: 4.3 NG/ML (ref 0–4.3)
CK MB: 5.6 NG/ML (ref 0–4.3)
CO2: 25 MMOL/L (ref 22–29)
CREAT SERPL-MCNC: 1.1 MG/DL (ref 0.5–1)
D DIMER: <200 NG/ML DDU
EOSINOPHILS ABSOLUTE: 0.04 E9/L (ref 0.05–0.5)
EOSINOPHILS RELATIVE PERCENT: 1.3 % (ref 0–6)
GFR AFRICAN AMERICAN: >60
GFR NON-AFRICAN AMERICAN: >60 ML/MIN/1.73
GLUCOSE BLD-MCNC: 121 MG/DL (ref 74–109)
HCT VFR BLD CALC: 38.5 % (ref 34–48)
HDLC SERPL-MCNC: 53 MG/DL
HEMOGLOBIN: 12.1 G/DL (ref 11.5–15.5)
IMMATURE GRANULOCYTES #: 0 E9/L
IMMATURE GRANULOCYTES %: 0 % (ref 0–5)
LDL CHOLESTEROL CALCULATED: 133 MG/DL (ref 0–99)
LV EF: 58 %
LV EF: 70 %
LVEF MODALITY: NORMAL
LVEF MODALITY: NORMAL
LYMPHOCYTES ABSOLUTE: 1.58 E9/L (ref 1.5–4)
LYMPHOCYTES RELATIVE PERCENT: 51.6 % (ref 20–42)
MCH RBC QN AUTO: 27.8 PG (ref 26–35)
MCHC RBC AUTO-ENTMCNC: 31.4 % (ref 32–34.5)
MCV RBC AUTO: 88.5 FL (ref 80–99.9)
MONOCYTES ABSOLUTE: 0.24 E9/L (ref 0.1–0.95)
MONOCYTES RELATIVE PERCENT: 7.8 % (ref 2–12)
NEUTROPHILS ABSOLUTE: 1.19 E9/L (ref 1.8–7.3)
NEUTROPHILS RELATIVE PERCENT: 39 % (ref 43–80)
PDW BLD-RTO: 13.4 FL (ref 11.5–15)
PLATELET # BLD: 220 E9/L (ref 130–450)
PMV BLD AUTO: 10.2 FL (ref 7–12)
POTASSIUM SERPL-SCNC: 3.7 MMOL/L (ref 3.5–5)
RBC # BLD: 4.35 E12/L (ref 3.5–5.5)
SODIUM BLD-SCNC: 140 MMOL/L (ref 132–146)
TOTAL CK: 216 U/L (ref 20–180)
TOTAL CK: 257 U/L (ref 20–180)
TOTAL PROTEIN: 7 G/DL (ref 6.4–8.3)
TRIGL SERPL-MCNC: 124 MG/DL (ref 0–149)
TROPONIN: <0.01 NG/ML (ref 0–0.03)
TROPONIN: <0.01 NG/ML (ref 0–0.03)
TSH SERPL DL<=0.05 MIU/L-ACNC: 0.59 UIU/ML (ref 0.27–4.2)
VLDLC SERPL CALC-MCNC: 25 MG/DL
WBC # BLD: 3.1 E9/L (ref 4.5–11.5)

## 2018-10-20 PROCEDURE — 85378 FIBRIN DEGRADE SEMIQUANT: CPT

## 2018-10-20 PROCEDURE — A9500 TC99M SESTAMIBI: HCPCS | Performed by: RADIOLOGY

## 2018-10-20 PROCEDURE — 80061 LIPID PANEL: CPT

## 2018-10-20 PROCEDURE — 71275 CT ANGIOGRAPHY CHEST: CPT

## 2018-10-20 PROCEDURE — 93306 TTE W/DOPPLER COMPLETE: CPT

## 2018-10-20 PROCEDURE — 6370000000 HC RX 637 (ALT 250 FOR IP): Performed by: FAMILY MEDICINE

## 2018-10-20 PROCEDURE — 6360000002 HC RX W HCPCS: Performed by: FAMILY MEDICINE

## 2018-10-20 PROCEDURE — G0378 HOSPITAL OBSERVATION PER HR: HCPCS

## 2018-10-20 PROCEDURE — 85025 COMPLETE CBC W/AUTO DIFF WBC: CPT

## 2018-10-20 PROCEDURE — 3430000000 HC RX DIAGNOSTIC RADIOPHARMACEUTICAL: Performed by: RADIOLOGY

## 2018-10-20 PROCEDURE — 6360000004 HC RX CONTRAST MEDICATION: Performed by: RADIOLOGY

## 2018-10-20 PROCEDURE — 93017 CV STRESS TEST TRACING ONLY: CPT

## 2018-10-20 PROCEDURE — 6370000000 HC RX 637 (ALT 250 FOR IP): Performed by: INTERNAL MEDICINE

## 2018-10-20 PROCEDURE — 80053 COMPREHEN METABOLIC PANEL: CPT

## 2018-10-20 PROCEDURE — 2580000003 HC RX 258: Performed by: FAMILY MEDICINE

## 2018-10-20 PROCEDURE — 82553 CREATINE MB FRACTION: CPT

## 2018-10-20 PROCEDURE — 84484 ASSAY OF TROPONIN QUANT: CPT

## 2018-10-20 PROCEDURE — 84443 ASSAY THYROID STIM HORMONE: CPT

## 2018-10-20 PROCEDURE — 96376 TX/PRO/DX INJ SAME DRUG ADON: CPT

## 2018-10-20 PROCEDURE — 78452 HT MUSCLE IMAGE SPECT MULT: CPT

## 2018-10-20 PROCEDURE — 36415 COLL VENOUS BLD VENIPUNCTURE: CPT

## 2018-10-20 PROCEDURE — 82550 ASSAY OF CK (CPK): CPT

## 2018-10-20 RX ORDER — CLOPIDOGREL BISULFATE 75 MG/1
75 TABLET ORAL DAILY
Status: DISCONTINUED | OUTPATIENT
Start: 2018-10-20 | End: 2018-10-22 | Stop reason: HOSPADM

## 2018-10-20 RX ADMIN — MORPHINE SULFATE 2 MG: 2 INJECTION, SOLUTION INTRAMUSCULAR; INTRAVENOUS at 13:49

## 2018-10-20 RX ADMIN — CARVEDILOL 25 MG: 25 TABLET, FILM COATED ORAL at 11:38

## 2018-10-20 RX ADMIN — NITROGLYCERIN 0.4 MG: 0.4 TABLET SUBLINGUAL at 16:51

## 2018-10-20 RX ADMIN — CLOPIDOGREL BISULFATE 75 MG: 75 TABLET ORAL at 22:33

## 2018-10-20 RX ADMIN — Medication 10 MILLICURIE: at 07:32

## 2018-10-20 RX ADMIN — ACETAMINOPHEN 650 MG: 325 TABLET, FILM COATED ORAL at 00:29

## 2018-10-20 RX ADMIN — MORPHINE SULFATE 2 MG: 2 INJECTION, SOLUTION INTRAMUSCULAR; INTRAVENOUS at 22:33

## 2018-10-20 RX ADMIN — LOSARTAN POTASSIUM 100 MG: 50 TABLET ORAL at 11:38

## 2018-10-20 RX ADMIN — IOPAMIDOL 80 ML: 755 INJECTION, SOLUTION INTRAVENOUS at 21:23

## 2018-10-20 RX ADMIN — Medication 10 ML: at 11:41

## 2018-10-20 RX ADMIN — REGADENOSON 0.4 MG: 0.08 INJECTION, SOLUTION INTRAVENOUS at 10:52

## 2018-10-20 RX ADMIN — NITROGLYCERIN 0.5 INCH: 20 OINTMENT TOPICAL at 22:33

## 2018-10-20 RX ADMIN — Medication 30 MILLICURIE: at 10:15

## 2018-10-20 RX ADMIN — NITROGLYCERIN 0.4 MG: 0.4 TABLET SUBLINGUAL at 00:29

## 2018-10-20 RX ADMIN — AMLODIPINE BESYLATE 5 MG: 5 TABLET ORAL at 11:39

## 2018-10-20 RX ADMIN — Medication 10 ML: at 22:33

## 2018-10-20 RX ADMIN — OXYCODONE HYDROCHLORIDE 10 MG: 5 TABLET ORAL at 12:07

## 2018-10-20 RX ADMIN — CARVEDILOL 25 MG: 25 TABLET, FILM COATED ORAL at 22:33

## 2018-10-20 ASSESSMENT — PAIN SCALES - GENERAL
PAINLEVEL_OUTOF10: 2
PAINLEVEL_OUTOF10: 7
PAINLEVEL_OUTOF10: 0
PAINLEVEL_OUTOF10: 6
PAINLEVEL_OUTOF10: 7
PAINLEVEL_OUTOF10: 6

## 2018-10-20 ASSESSMENT — PAIN DESCRIPTION - LOCATION
LOCATION: CHEST
LOCATION: CHEST

## 2018-10-20 ASSESSMENT — PAIN DESCRIPTION - DESCRIPTORS: DESCRIPTORS: ACHING

## 2018-10-20 ASSESSMENT — PAIN DESCRIPTION - PAIN TYPE
TYPE: ACUTE PAIN
TYPE: ACUTE PAIN

## 2018-10-20 NOTE — PROCEDURES
[address] [place]                             CARDIAC STRESS TEST    PATIENT NAME: Erica Goldman              :         1960  MED REC NO:   [id number]                            ROOM:       5807  ACCOUNT NO:   [de-identified]                           ADMIT DATE:  10/19/2018  PROVIDER:     Cris Masters DO    CARDIOVASCULAR DIAGNOSTIC DEPARTMENT    DATE OF STUDY:  10/20/2018    LEXISCAN STRESS TEST    INDICATIONS:  Intravenous Lexiscan stress test was performed for  evaluation of chest pain with nuclear imaging in the form of  Cardiolite with resting EKG to be a normal sinus mechanism. Ventricular rate was 67 beats per minute, evidence of first-degree AV  block was noted with the KS interval being in 290 milliseconds. The  QRS complex was normal duration. No acute finding was present. The  old anterior wall infarct cannot be excluded. Blood pressure at the  beginning of the stress test was 137/73. With the aid of nuclear  medicine in the form of Cardiolite, intravenous Lexiscan at 0.4 mg was  infused over a 10-second period. Immediately after infusion of  Lexiscan, the patient was injected with Cardiolite and the test was  terminated at 1 minute. The patient was observed in the recovery  phase for 4 minutes. Maximum heart rate achieved during the  administration of Lexiscan was 53% of maximum predicted. Blood  pressure was stable during infusion at 137/73. Electrographically,  there was no definite evidence of Lexiscan-induced ischemia. Isolated  PVCs was present in the recovery phase. The patient exhibit no chest  pain or discomfort and was transferred to the Nuclear Department for  Cardiolite imaging. In conclusion, no evidence of Lexiscan-induced  ischemia. Isolated PVCs was present. Clinical correlation is  recommended.         [facility],     D: 10/20/2018 16:50:32       T: 10/20/2018 17:44:52     [email or web address]  Job#: [id number]     Doc#: [id number]    CC:  <>

## 2018-10-21 PROBLEM — I20.9 ANGINAL PAIN (HCC): Status: ACTIVE | Noted: 2018-10-21

## 2018-10-21 PROCEDURE — 6370000000 HC RX 637 (ALT 250 FOR IP): Performed by: FAMILY MEDICINE

## 2018-10-21 PROCEDURE — 6370000000 HC RX 637 (ALT 250 FOR IP): Performed by: INTERNAL MEDICINE

## 2018-10-21 PROCEDURE — 6360000002 HC RX W HCPCS: Performed by: FAMILY MEDICINE

## 2018-10-21 PROCEDURE — 2580000003 HC RX 258: Performed by: FAMILY MEDICINE

## 2018-10-21 PROCEDURE — 96376 TX/PRO/DX INJ SAME DRUG ADON: CPT

## 2018-10-21 PROCEDURE — 1200000000 HC SEMI PRIVATE

## 2018-10-21 RX ORDER — POLYETHYLENE GLYCOL 3350 17 G/17G
17 POWDER, FOR SOLUTION ORAL DAILY PRN
Status: DISCONTINUED | OUTPATIENT
Start: 2018-10-21 | End: 2018-10-22 | Stop reason: HOSPADM

## 2018-10-21 RX ORDER — SODIUM CHLORIDE 9 MG/ML
INJECTION, SOLUTION INTRAVENOUS CONTINUOUS
Status: DISCONTINUED | OUTPATIENT
Start: 2018-10-22 | End: 2018-10-22 | Stop reason: HOSPADM

## 2018-10-21 RX ORDER — METOPROLOL TARTRATE 5 MG/5ML
5 INJECTION INTRAVENOUS EVERY 5 MIN PRN
Status: DISCONTINUED | OUTPATIENT
Start: 2018-10-21 | End: 2018-10-22 | Stop reason: HOSPADM

## 2018-10-21 RX ORDER — DOCUSATE SODIUM 100 MG/1
100 CAPSULE, LIQUID FILLED ORAL 2 TIMES DAILY PRN
Status: DISCONTINUED | OUTPATIENT
Start: 2018-10-21 | End: 2018-10-22 | Stop reason: HOSPADM

## 2018-10-21 RX ADMIN — MORPHINE SULFATE 2 MG: 2 INJECTION, SOLUTION INTRAMUSCULAR; INTRAVENOUS at 08:12

## 2018-10-21 RX ADMIN — CARVEDILOL 25 MG: 25 TABLET, FILM COATED ORAL at 08:11

## 2018-10-21 RX ADMIN — NITROGLYCERIN 0.5 INCH: 20 OINTMENT TOPICAL at 20:42

## 2018-10-21 RX ADMIN — NALOXEGOL OXALATE 25 MG: 12.5 TABLET, FILM COATED ORAL at 18:01

## 2018-10-21 RX ADMIN — HYDROCHLOROTHIAZIDE 25 MG: 25 TABLET ORAL at 08:11

## 2018-10-21 RX ADMIN — MORPHINE SULFATE 2 MG: 2 INJECTION, SOLUTION INTRAMUSCULAR; INTRAVENOUS at 22:50

## 2018-10-21 RX ADMIN — OXYCODONE HYDROCHLORIDE 10 MG: 5 TABLET ORAL at 10:01

## 2018-10-21 RX ADMIN — LOSARTAN POTASSIUM 100 MG: 50 TABLET ORAL at 08:10

## 2018-10-21 RX ADMIN — NITROGLYCERIN 0.5 INCH: 20 OINTMENT TOPICAL at 06:52

## 2018-10-21 RX ADMIN — NITROGLYCERIN 0.5 INCH: 20 OINTMENT TOPICAL at 13:36

## 2018-10-21 RX ADMIN — AMLODIPINE BESYLATE 5 MG: 5 TABLET ORAL at 08:11

## 2018-10-21 RX ADMIN — TIZANIDINE 2 MG: 4 TABLET ORAL at 20:42

## 2018-10-21 RX ADMIN — CLOPIDOGREL BISULFATE 75 MG: 75 TABLET ORAL at 08:10

## 2018-10-21 RX ADMIN — Medication 10 ML: at 20:42

## 2018-10-21 RX ADMIN — MORPHINE SULFATE 2 MG: 2 INJECTION, SOLUTION INTRAMUSCULAR; INTRAVENOUS at 01:55

## 2018-10-21 RX ADMIN — MORPHINE SULFATE 2 MG: 2 INJECTION, SOLUTION INTRAMUSCULAR; INTRAVENOUS at 18:01

## 2018-10-21 RX ADMIN — Medication 10 ML: at 08:10

## 2018-10-21 RX ADMIN — PANTOPRAZOLE SODIUM 40 MG: 40 TABLET, DELAYED RELEASE ORAL at 06:52

## 2018-10-21 RX ADMIN — MORPHINE SULFATE 2 MG: 2 INJECTION, SOLUTION INTRAMUSCULAR; INTRAVENOUS at 14:43

## 2018-10-21 RX ADMIN — CARVEDILOL 25 MG: 25 TABLET, FILM COATED ORAL at 16:05

## 2018-10-21 RX ADMIN — POLYETHYLENE GLYCOL (3350) 17 G: 17 POWDER, FOR SOLUTION ORAL at 20:41

## 2018-10-21 RX ADMIN — Medication 10 ML: at 14:43

## 2018-10-21 RX ADMIN — Medication 10 ML: at 04:56

## 2018-10-21 RX ADMIN — MORPHINE SULFATE 2 MG: 2 INJECTION, SOLUTION INTRAMUSCULAR; INTRAVENOUS at 04:56

## 2018-10-21 RX ADMIN — SUCRALFATE 1 G: 1 TABLET ORAL at 20:41

## 2018-10-21 ASSESSMENT — PAIN SCALES - GENERAL
PAINLEVEL_OUTOF10: 6
PAINLEVEL_OUTOF10: 0
PAINLEVEL_OUTOF10: 7
PAINLEVEL_OUTOF10: 7
PAINLEVEL_OUTOF10: 4
PAINLEVEL_OUTOF10: 7
PAINLEVEL_OUTOF10: 6
PAINLEVEL_OUTOF10: 6
PAINLEVEL_OUTOF10: 0
PAINLEVEL_OUTOF10: 3
PAINLEVEL_OUTOF10: 2
PAINLEVEL_OUTOF10: 7
PAINLEVEL_OUTOF10: 5
PAINLEVEL_OUTOF10: 0

## 2018-10-21 ASSESSMENT — PAIN DESCRIPTION - FREQUENCY
FREQUENCY: CONTINUOUS
FREQUENCY: INTERMITTENT

## 2018-10-21 ASSESSMENT — PAIN DESCRIPTION - DESCRIPTORS
DESCRIPTORS: DISCOMFORT
DESCRIPTORS: ACHING;DISCOMFORT;SORE
DESCRIPTORS: ACHING;DISCOMFORT;SORE
DESCRIPTORS: DISCOMFORT

## 2018-10-21 ASSESSMENT — PAIN DESCRIPTION - ONSET
ONSET: ON-GOING
ONSET: GRADUAL
ONSET: ON-GOING
ONSET: ON-GOING

## 2018-10-21 ASSESSMENT — PAIN DESCRIPTION - LOCATION
LOCATION: BACK
LOCATION: GENERALIZED
LOCATION: ABDOMEN;BACK;CHEST;LEG
LOCATION: ABDOMEN;BACK;CHEST;LEG
LOCATION: BACK

## 2018-10-21 ASSESSMENT — PAIN DESCRIPTION - PAIN TYPE
TYPE: ACUTE PAIN
TYPE: CHRONIC PAIN
TYPE: CHRONIC PAIN
TYPE: ACUTE PAIN;CHRONIC PAIN
TYPE: ACUTE PAIN

## 2018-10-21 ASSESSMENT — ENCOUNTER SYMPTOMS
NAUSEA: 0
VOMITING: 0
COUGH: 0
SHORTNESS OF BREATH: 0
DIARRHEA: 0

## 2018-10-21 ASSESSMENT — PAIN DESCRIPTION - PROGRESSION
CLINICAL_PROGRESSION: NOT CHANGED
CLINICAL_PROGRESSION: GRADUALLY WORSENING
CLINICAL_PROGRESSION: GRADUALLY WORSENING
CLINICAL_PROGRESSION: NOT CHANGED

## 2018-10-21 ASSESSMENT — PAIN DESCRIPTION - ORIENTATION
ORIENTATION: LOWER
ORIENTATION: LOWER

## 2018-10-21 NOTE — H&P
day as needed, Protonix 40 mg two day. FAMILY HISTORY:  Positive for hypertension, positive for coronary  artery disease. REVIEW OF SYSTEMS:  Essentially negative except for the present  illness. PHYSICAL EXAMINATION:  GENERAL:  Well-developed, well-nourished black female, active and  cooperative, oriented x3, in acute distress secondary to midsternal  chest pain. HEENT:  Normocephalic and atraumatic. Pupils equal, round, react to  light and accommodation. Extraocular muscles are intact. ENT clear. NECK:  Supple. No bruits. COR:  Regular rate and rhythm. LUNGS:  Clear. ABDOMEN:  Soft. No pain, no tenderness. No rebound. No spasm. Bowel sounds are positive. EXTREMITIES:  No clubbing, no edema, and no cyanosis. Dorsalis 1+. NEUROLOGIC:  Cranial nerves II through XII intact distally, grossly  intact. ASSESSMENT:  1. Chest pain - cardiac angina relieved with nitroglycerin. 2.  Hypertension. 3.  Smoking history. 4.  Arthritis of the lumbar spine. 5.  Arthritis of the knees. 6.  COPD. 7.  Herniated lumbar disk. 8.  Mitral valve prolapse. PLAN:  Admit, observation status, serial cardiac enzymes, serial EKGs  will be obtained and the patient will have a Lexiscan stress test  performed. CT scan of the chest will be ordered. Dr. Shalonda Justin - staff  cardiologist will be consulted.         Margeret Olszewski, MD    D: 10/20/2018 16:36:09       T: 10/20/2018 18:21:19     [email or web address]  Job#: [id number]     Doc#: [id number]    CC:

## 2018-10-22 ENCOUNTER — APPOINTMENT (OUTPATIENT)
Dept: CT IMAGING | Age: 58
DRG: 311 | End: 2018-10-22
Payer: COMMERCIAL

## 2018-10-22 VITALS
OXYGEN SATURATION: 97 % | HEART RATE: 65 BPM | BODY MASS INDEX: 32.78 KG/M2 | RESPIRATION RATE: 16 BRPM | WEIGHT: 185 LBS | SYSTOLIC BLOOD PRESSURE: 101 MMHG | HEIGHT: 63 IN | DIASTOLIC BLOOD PRESSURE: 52 MMHG | TEMPERATURE: 97.6 F

## 2018-10-22 PROCEDURE — 6370000000 HC RX 637 (ALT 250 FOR IP): Performed by: FAMILY MEDICINE

## 2018-10-22 PROCEDURE — 6360000002 HC RX W HCPCS: Performed by: FAMILY MEDICINE

## 2018-10-22 PROCEDURE — 75574 CT ANGIO HRT W/3D IMAGE: CPT

## 2018-10-22 PROCEDURE — 6360000004 HC RX CONTRAST MEDICATION: Performed by: RADIOLOGY

## 2018-10-22 PROCEDURE — 2580000003 HC RX 258: Performed by: INTERNAL MEDICINE

## 2018-10-22 PROCEDURE — 6370000000 HC RX 637 (ALT 250 FOR IP): Performed by: INTERNAL MEDICINE

## 2018-10-22 PROCEDURE — 2580000003 HC RX 258: Performed by: FAMILY MEDICINE

## 2018-10-22 RX ORDER — ISOSORBIDE MONONITRATE 30 MG/1
30 TABLET, EXTENDED RELEASE ORAL DAILY
Qty: 30 TABLET | Refills: 3 | Status: SHIPPED | OUTPATIENT
Start: 2018-10-22

## 2018-10-22 RX ADMIN — CARVEDILOL 25 MG: 25 TABLET, FILM COATED ORAL at 09:43

## 2018-10-22 RX ADMIN — OXYCODONE HYDROCHLORIDE 10 MG: 5 TABLET ORAL at 11:47

## 2018-10-22 RX ADMIN — LOSARTAN POTASSIUM 100 MG: 50 TABLET ORAL at 09:43

## 2018-10-22 RX ADMIN — CARVEDILOL 25 MG: 25 TABLET, FILM COATED ORAL at 16:30

## 2018-10-22 RX ADMIN — NITROGLYCERIN 0.5 INCH: 20 OINTMENT TOPICAL at 14:21

## 2018-10-22 RX ADMIN — MORPHINE SULFATE 2 MG: 2 INJECTION, SOLUTION INTRAMUSCULAR; INTRAVENOUS at 15:30

## 2018-10-22 RX ADMIN — MORPHINE SULFATE 2 MG: 2 INJECTION, SOLUTION INTRAMUSCULAR; INTRAVENOUS at 19:24

## 2018-10-22 RX ADMIN — PANTOPRAZOLE SODIUM 40 MG: 40 TABLET, DELAYED RELEASE ORAL at 09:50

## 2018-10-22 RX ADMIN — CLOPIDOGREL BISULFATE 75 MG: 75 TABLET ORAL at 09:43

## 2018-10-22 RX ADMIN — SODIUM CHLORIDE: 9 INJECTION, SOLUTION INTRAVENOUS at 01:24

## 2018-10-22 RX ADMIN — AMLODIPINE BESYLATE 5 MG: 5 TABLET ORAL at 09:43

## 2018-10-22 RX ADMIN — IOPAMIDOL 100 ML: 755 INJECTION, SOLUTION INTRAVENOUS at 09:01

## 2018-10-22 RX ADMIN — Medication 10 ML: at 15:30

## 2018-10-22 RX ADMIN — MORPHINE SULFATE 2 MG: 2 INJECTION, SOLUTION INTRAMUSCULAR; INTRAVENOUS at 04:06

## 2018-10-22 RX ADMIN — Medication 10 ML: at 09:46

## 2018-10-22 RX ADMIN — HYDROCHLOROTHIAZIDE 25 MG: 25 TABLET ORAL at 09:43

## 2018-10-22 ASSESSMENT — PAIN DESCRIPTION - PAIN TYPE
TYPE: ACUTE PAIN;CHRONIC PAIN
TYPE: CHRONIC PAIN
TYPE: ACUTE PAIN
TYPE: CHRONIC PAIN

## 2018-10-22 ASSESSMENT — PAIN DESCRIPTION - ORIENTATION
ORIENTATION: MID
ORIENTATION: MID

## 2018-10-22 ASSESSMENT — PAIN DESCRIPTION - DESCRIPTORS
DESCRIPTORS: ACHING;DISCOMFORT;SORE
DESCRIPTORS: DISCOMFORT;ACHING
DESCRIPTORS: ACHING;DISCOMFORT;SORE
DESCRIPTORS: ACHING;CONSTANT;DISCOMFORT

## 2018-10-22 ASSESSMENT — PAIN DESCRIPTION - LOCATION
LOCATION: BACK
LOCATION: ABDOMEN;BACK
LOCATION: ABDOMEN;BACK;CHEST
LOCATION: ABDOMEN;BACK;LEG

## 2018-10-22 ASSESSMENT — PAIN SCALES - GENERAL
PAINLEVEL_OUTOF10: 7
PAINLEVEL_OUTOF10: 0
PAINLEVEL_OUTOF10: 4
PAINLEVEL_OUTOF10: 7
PAINLEVEL_OUTOF10: 10
PAINLEVEL_OUTOF10: 5
PAINLEVEL_OUTOF10: 5
PAINLEVEL_OUTOF10: 0

## 2018-10-22 ASSESSMENT — ENCOUNTER SYMPTOMS
VOMITING: 0
NAUSEA: 0
SHORTNESS OF BREATH: 0
DIARRHEA: 0
COUGH: 0

## 2018-10-22 ASSESSMENT — PAIN DESCRIPTION - ONSET
ONSET: ON-GOING

## 2018-10-22 ASSESSMENT — PAIN DESCRIPTION - PROGRESSION
CLINICAL_PROGRESSION: NOT CHANGED
CLINICAL_PROGRESSION: NOT CHANGED

## 2018-10-22 ASSESSMENT — PAIN DESCRIPTION - FREQUENCY
FREQUENCY: CONTINUOUS

## 2018-10-22 NOTE — PROGRESS NOTES
ADITHYA and P dictated
Lexiscan stress test completed. Tolerated well.
Patient is seen in follow-up for chest pain    Subjective:     Ms. Holly Marmolejo  States that nitropaste on her arm is keeping her asymptomatic. Denies any chest pain, shortness of breath, or lightheadedness. Sitting on side of bed, in no apparent distress    ROS:  CONSTITUTIONAL:  negative for  fevers, chills  HEENT:  negative for earaches, nasal congestion and epistaxis  RESPIRATORY:  negative for  dry cough, cough with sputum,wheezing and hemoptysis  GASTROINTESTINAL:  negative for nausea, vomiting  MUSCULOSKELETAL:  negative for  myalgias, arthralgias  NEUROLOGICAL:  negative for visual disturbance, dysphagia    Medication side effects: None    Scheduled Meds:   nitroglycerin  0.5 inch Topical 3 times per day    clopidogrel  75 mg Oral Daily    sodium chloride flush  10 mL Intravenous 2 times per day    amLODIPine  5 mg Oral Daily    carvedilol  25 mg Oral BID WC    pantoprazole  40 mg Oral QAM AC    sucralfate  1 g Oral BID    losartan  100 mg Oral Daily    And    hydrochlorothiazide  25 mg Oral Daily     Continuous Infusions:   sodium chloride 75 mL/hr at 10/22/18 0124     PRN Meds:docusate sodium, polyethylene glycol, metoprolol, sodium chloride flush, acetaminophen, acetaminophen, oxyCODONE HCl, tiZANidine, perflutren lipid microspheres, morphine, nitroGLYCERIN      Objective:      Physical Exam:   BP (!) 144/78   Pulse 60   Temp 97.3 °F (36.3 °C) (Oral)   Resp 18   Ht 5' 3\" (1.6 m)   Wt 185 lb (83.9 kg)   LMP 03/01/2006   SpO2 96%   BMI 32.77 kg/m²   CONSTITUTIONAL:  awake, alert, cooperative, no apparent distress, and appears stated age  HEAD:  normocepalic, without obvious abnormality, atraumatic, pink, moist mucous membranes.   LUNGS:  No increased work of breathing, good air exchange, clear to auscultation bilaterally, no crackles or wheezing  CARDIOVASCULAR:  Normal apical impulse, regular rate and rhythm, normal S1 and S2, no S3 or S4, and no murmur noted and no JVD, no carotid
S2, no S3 or S4, and no murmur noted and no JVD, no carotid bruit, no pedal edema, good carotid upstroke bilaterally. ABDOMEN:  Soft, nontender, no masses, no hepatomegaly or splenomegaly, BS+  CHEST: nontender to palpation, expands symmetrically  MUSCULOSKELETAL:  No clubbing no cyanosis. there is no redness, warmth, or swelling of the joints  full range of motion noted  NEUROLOGIC:  Alert, awake,oriented x3  SKIN:  no bruising or bleeding, normal skin color, texture, turgor and no redness, warmth, or swelling     Cardiographics  I personally reviewed the telemetry monitor strips with the following interpretation: Sinus rhythm  Echocardiogram:7/9/2016,Summary   Normal left ventricular ejection fraction.   Measured ejection fraction is 54 %.   The left atrium is mildly dilated.   Physiologic mitral regurgitation is present.   Physiologic tricuspid regurgitation.   There is mild pulmonary hypertension.        Imaging  CTA CHEST W CONTRAST   Final Result   1. No evidence of PE.   2. Linear atelectasis in the lingula. Stress/Rest NM Myocardial SPECT Exercise   Final Result   Pharmacologic stress myocardial perfusion imaging within   normal limits. Stress induced ischemia is not demonstrated. Wall motion         SPECT gated examination of myocardial contractility was performed in   the usual fashion in conjunction with the SPECT perfusion study. Left   ventricular chamber size is normal.  Left ventricular myocardium   demonstrates normal contractility at stress with expected thickening   of the left ventricular myocardium during systole. IMPRESSION:  Normal study. Ejection fraction      Findings: Left ventricular ejection fraction calculated at 70 %. IMPRESSION: Left ventricular ejection fraction  70 %. XR CHEST STANDARD (2 VW)   Final Result   1. No acute cardiopulmonary disease, without acute infiltrate or   significant change since 8/18/2018.    2. Minimal right basilar
 morphine injection 2 mg  2 mg Intravenous Q1H PRN Bev Booker MD   2 mg at 10/21/18 1801    nitroGLYCERIN (NITROSTAT) SL tablet 0.4 mg  0.4 mg Sublingual Q5 Min PRN Bev Booker MD   0.4 mg at 10/20/18 1651    losartan (COZAAR) tablet 100 mg  100 mg Oral Daily Bev Booker MD   100 mg at 10/21/18 2229    And    hydrochlorothiazide (HYDRODIURIL) tablet 25 mg  25 mg Oral Daily Bev Booker MD   25 mg at 10/21/18 8454     Allergies   Allergen Reactions    Aspirin Swelling    Prochlorperazine Edisylate Swelling    Tegretol [Carbamazepine] Swelling    Compazine [Prochlorperazine Maleate]      Active Problems:    Chest pain    Anginal pain ([address]  Resolved Problems:    * No resolved hospital problems. *    Blood pressure (!) 103/57, pulse 57, temperature 97.8 °F (36.6 °C), temperature source Oral, resp. rate 18, height 5' 3\" (1.6 m), weight 185 lb (83.9 kg), last menstrual period 03/01/2006, SpO2 97 %, not currently breastfeeding. Subjective:  Symptoms:  Stable. She reports malaise, chest pain, weakness and anxiety. No shortness of breath, cough, headache, chest pressure, anorexia or diarrhea. Diet:  Adequate intake. No nausea or vomiting. Activity level: Impaired due to weakness. Pain:  She complains of pain that is severe. Pain is requiring pain medication. Objective:  General Appearance:  Comfortable. Vital signs: (most recent): Blood pressure (!) 103/57, pulse 57, temperature 97.8 °F (36.6 °C), temperature source Oral, resp. rate 18, height 5' 3\" (1.6 m), weight 185 lb (83.9 kg), last menstrual period 03/01/2006, SpO2 97 %, not currently breastfeeding. Vital signs are normal.  No fever. Output: Producing urine. HEENT: Normal HEENT exam.    Lungs:  Normal effort and normal respiratory rate. She is not in respiratory distress. No rales, decreased breath sounds, wheezes or rhonchi. Heart: Normal rate. Regular rhythm. No murmur.    Abdomen: Abdomen is soft and
No fever. Output: Producing urine. HEENT: Normal HEENT exam.    Lungs:  Normal effort and normal respiratory rate. She is not in respiratory distress. No decreased breath sounds, wheezes or rhonchi. Heart: Normal rate. Regular rhythm. No murmur. Abdomen: Abdomen is soft and non-distended. Bowel sounds are normal.   There is no abdominal tenderness. There is no epigastric area or suprapubic area tenderness. There is no rebound tenderness. There is no mass. Assessment:  (Principal Problem:    Anginal pain ([address]  Active Problems:    Herniated lumbar intervertebral disc    Degenerative arthritis of lumbar spine    Anxiety    Mass of right lobe of liver    Hypertension    Arthritis    MVP (mitral valve prolapse)    Chest pain  Resolved Problems:    * No resolved hospital problems. *   ). Plan:   (Pain relieved with ntg  On ntg patch and plavix  cta chest neg  For cta coronaries).        Floridalma Howell MD  10/22/2018

## 2019-03-21 ENCOUNTER — HOSPITAL ENCOUNTER (EMERGENCY)
Age: 59
Discharge: HOME | End: 2019-03-21
Attending: EMERGENCY MEDICINE
Payer: COMMERCIAL

## 2019-03-21 VITALS
RESPIRATION RATE: 20 BRPM | HEIGHT: 63 IN | TEMPERATURE: 98.5 F | OXYGEN SATURATION: 100 % | SYSTOLIC BLOOD PRESSURE: 160 MMHG | BODY MASS INDEX: 30.83 KG/M2 | DIASTOLIC BLOOD PRESSURE: 73 MMHG | HEART RATE: 84 BPM | WEIGHT: 174 LBS

## 2019-03-21 DIAGNOSIS — J02.0 STREP PHARYNGITIS: Primary | ICD-10-CM

## 2019-03-21 LAB
INFLUENZA A BY PCR: NOT DETECTED
INFLUENZA B BY PCR: NOT DETECTED
STREP GRP A PCR: POSITIVE

## 2019-03-21 PROCEDURE — 6360000002 HC RX W HCPCS: Performed by: EMERGENCY MEDICINE

## 2019-03-21 PROCEDURE — 87880 STREP A ASSAY W/OPTIC: CPT

## 2019-03-21 PROCEDURE — 6370000000 HC RX 637 (ALT 250 FOR IP): Performed by: EMERGENCY MEDICINE

## 2019-03-21 PROCEDURE — 87502 INFLUENZA DNA AMP PROBE: CPT

## 2019-03-21 PROCEDURE — 96372 THER/PROPH/DIAG INJ SC/IM: CPT

## 2019-03-21 PROCEDURE — 99283 EMERGENCY DEPT VISIT LOW MDM: CPT

## 2019-03-21 RX ORDER — AMOXICILLIN 250 MG/1
500 CAPSULE ORAL ONCE
Status: COMPLETED | OUTPATIENT
Start: 2019-03-21 | End: 2019-03-21

## 2019-03-21 RX ORDER — KETOROLAC TROMETHAMINE 30 MG/ML
30 INJECTION, SOLUTION INTRAMUSCULAR; INTRAVENOUS ONCE
Status: COMPLETED | OUTPATIENT
Start: 2019-03-21 | End: 2019-03-21

## 2019-03-21 RX ORDER — OXYCODONE AND ACETAMINOPHEN 7.5; 325 MG/1; MG/1
1 TABLET ORAL EVERY 6 HOURS PRN
COMMUNITY

## 2019-03-21 RX ORDER — AMOXICILLIN 250 MG/1
500 CAPSULE ORAL 3 TIMES DAILY
Qty: 60 CAPSULE | Refills: 0 | Status: SHIPPED | OUTPATIENT
Start: 2019-03-21 | End: 2019-03-31

## 2019-03-21 RX ORDER — IBUPROFEN 800 MG/1
800 TABLET ORAL EVERY 8 HOURS PRN
Qty: 21 TABLET | Refills: 0 | Status: ON HOLD | OUTPATIENT
Start: 2019-03-21 | End: 2019-05-20 | Stop reason: ALTCHOICE

## 2019-03-21 RX ADMIN — AMOXICILLIN 500 MG: 250 CAPSULE ORAL at 06:17

## 2019-03-21 RX ADMIN — KETOROLAC TROMETHAMINE 30 MG: 30 INJECTION, SOLUTION INTRAMUSCULAR; INTRAVENOUS at 05:25

## 2019-03-21 ASSESSMENT — ENCOUNTER SYMPTOMS
SORE THROAT: 1
WHEEZING: 0
EYE DISCHARGE: 0
EYE PAIN: 0
DIARRHEA: 0
SINUS PRESSURE: 0
NAUSEA: 0
ABDOMINAL DISTENTION: 0
SHORTNESS OF BREATH: 0
BACK PAIN: 0
VOMITING: 0
COUGH: 1
EYE REDNESS: 0

## 2019-03-21 ASSESSMENT — PAIN SCALES - GENERAL
PAINLEVEL_OUTOF10: 10
PAINLEVEL_OUTOF10: 8

## 2019-03-21 ASSESSMENT — PAIN DESCRIPTION - DESCRIPTORS: DESCRIPTORS: ACHING

## 2019-03-21 ASSESSMENT — PAIN DESCRIPTION - PAIN TYPE: TYPE: ACUTE PAIN

## 2019-03-21 ASSESSMENT — PAIN DESCRIPTION - ONSET: ONSET: ON-GOING

## 2019-03-21 ASSESSMENT — PAIN DESCRIPTION - LOCATION: LOCATION: GENERALIZED

## 2019-03-21 ASSESSMENT — PAIN DESCRIPTION - FREQUENCY: FREQUENCY: CONTINUOUS

## 2019-05-10 ENCOUNTER — HOSPITAL ENCOUNTER (OUTPATIENT)
Dept: ULTRASOUND IMAGING | Age: 59
Discharge: HOME | End: 2019-05-10
Payer: COMMERCIAL

## 2019-05-10 DIAGNOSIS — R10.11 RUQ PAIN: ICD-10-CM

## 2019-05-10 PROCEDURE — 76705 ECHO EXAM OF ABDOMEN: CPT

## 2019-05-19 ASSESSMENT — PAIN DESCRIPTION - PAIN TYPE: TYPE: ACUTE PAIN

## 2019-05-19 ASSESSMENT — PAIN SCALES - GENERAL: PAINLEVEL_OUTOF10: 10

## 2019-05-19 ASSESSMENT — PAIN DESCRIPTION - ORIENTATION: ORIENTATION: RIGHT

## 2019-05-20 ENCOUNTER — APPOINTMENT (OUTPATIENT)
Dept: GENERAL RADIOLOGY | Age: 59
End: 2019-05-20
Payer: COMMERCIAL

## 2019-05-20 ENCOUNTER — APPOINTMENT (OUTPATIENT)
Dept: CT IMAGING | Age: 59
End: 2019-05-20
Payer: COMMERCIAL

## 2019-05-20 ENCOUNTER — HOSPITAL ENCOUNTER (OUTPATIENT)
Age: 59
Setting detail: OBSERVATION
Discharge: HOME | End: 2019-05-22
Attending: EMERGENCY MEDICINE | Admitting: FAMILY MEDICINE
Payer: COMMERCIAL

## 2019-05-20 ENCOUNTER — ANESTHESIA EVENT (OUTPATIENT)
Dept: ENDOSCOPY | Age: 59
End: 2019-05-20
Payer: COMMERCIAL

## 2019-05-20 DIAGNOSIS — N17.9 AKI (ACUTE KIDNEY INJURY) (HCC): ICD-10-CM

## 2019-05-20 DIAGNOSIS — K52.9 COLITIS: Primary | ICD-10-CM

## 2019-05-20 LAB
ALBUMIN SERPL-MCNC: 3.5 G/DL (ref 3.5–5.2)
ALBUMIN SERPL-MCNC: 4 G/DL (ref 3.5–5.2)
ALP BLD-CCNC: 84 U/L (ref 35–104)
ALP BLD-CCNC: 87 U/L (ref 35–104)
ALT SERPL-CCNC: 13 U/L (ref 0–32)
ALT SERPL-CCNC: 15 U/L (ref 0–32)
ANION GAP SERPL CALCULATED.3IONS-SCNC: 13 MMOL/L (ref 7–16)
ANION GAP SERPL CALCULATED.3IONS-SCNC: 13 MMOL/L (ref 7–16)
AST SERPL-CCNC: 23 U/L (ref 0–31)
AST SERPL-CCNC: 35 U/L (ref 0–31)
BASOPHILS ABSOLUTE: 0.02 E9/L (ref 0–0.2)
BASOPHILS RELATIVE PERCENT: 0.3 % (ref 0–2)
BILIRUB SERPL-MCNC: 0.3 MG/DL (ref 0–1.2)
BILIRUB SERPL-MCNC: <0.2 MG/DL (ref 0–1.2)
BILIRUBIN URINE: NEGATIVE
BLOOD, URINE: NEGATIVE
BUN BLDV-MCNC: 22 MG/DL (ref 8–23)
BUN BLDV-MCNC: 23 MG/DL (ref 8–23)
CALCIUM SERPL-MCNC: 8.8 MG/DL (ref 8.6–10.2)
CALCIUM SERPL-MCNC: 9.3 MG/DL (ref 8.6–10.2)
CHLORIDE BLD-SCNC: 102 MMOL/L (ref 98–107)
CHLORIDE BLD-SCNC: 104 MMOL/L (ref 98–107)
CHOLESTEROL, TOTAL: 186 MG/DL (ref 0–199)
CLARITY: CLEAR
CO2: 23 MMOL/L (ref 22–29)
CO2: 24 MMOL/L (ref 22–29)
COLOR: YELLOW
CREAT SERPL-MCNC: 1.6 MG/DL (ref 0.5–1)
CREAT SERPL-MCNC: 1.8 MG/DL (ref 0.5–1)
EOSINOPHILS ABSOLUTE: 0.1 E9/L (ref 0.05–0.5)
EOSINOPHILS RELATIVE PERCENT: 1.7 % (ref 0–6)
GFR AFRICAN AMERICAN: 35
GFR AFRICAN AMERICAN: 40
GFR NON-AFRICAN AMERICAN: 35 ML/MIN/1.73
GFR NON-AFRICAN AMERICAN: 40 ML/MIN/1.73
GLUCOSE BLD-MCNC: 126 MG/DL (ref 74–99)
GLUCOSE BLD-MCNC: 148 MG/DL (ref 74–99)
GLUCOSE URINE: NEGATIVE MG/DL
HCT VFR BLD CALC: 36.9 % (ref 34–48)
HDLC SERPL-MCNC: 48 MG/DL
HEMOGLOBIN: 11.5 G/DL (ref 11.5–15.5)
IMMATURE GRANULOCYTES #: 0.02 E9/L
IMMATURE GRANULOCYTES %: 0.3 % (ref 0–5)
KETONES, URINE: NEGATIVE MG/DL
LACTIC ACID: 1.5 MMOL/L (ref 0.5–2.2)
LDL CHOLESTEROL CALCULATED: 107 MG/DL (ref 0–99)
LEUKOCYTE ESTERASE, URINE: NEGATIVE
LIPASE: 18 U/L (ref 13–60)
LYMPHOCYTES ABSOLUTE: 2.89 E9/L (ref 1.5–4)
LYMPHOCYTES RELATIVE PERCENT: 50.4 % (ref 20–42)
MCH RBC QN AUTO: 28 PG (ref 26–35)
MCHC RBC AUTO-ENTMCNC: 31.2 % (ref 32–34.5)
MCV RBC AUTO: 90 FL (ref 80–99.9)
MONOCYTES ABSOLUTE: 0.44 E9/L (ref 0.1–0.95)
MONOCYTES RELATIVE PERCENT: 7.7 % (ref 2–12)
NEUTROPHILS ABSOLUTE: 2.26 E9/L (ref 1.8–7.3)
NEUTROPHILS RELATIVE PERCENT: 39.6 % (ref 43–80)
NITRITE, URINE: NEGATIVE
PDW BLD-RTO: 13.9 FL (ref 11.5–15)
PH UA: 5.5 (ref 5–9)
PLATELET # BLD: 219 E9/L (ref 130–450)
PMV BLD AUTO: 10 FL (ref 7–12)
POTASSIUM SERPL-SCNC: 3.6 MMOL/L (ref 3.5–5)
POTASSIUM SERPL-SCNC: 4.2 MMOL/L (ref 3.5–5)
PROTEIN UA: NEGATIVE MG/DL
RBC # BLD: 4.1 E12/L (ref 3.5–5.5)
SODIUM BLD-SCNC: 138 MMOL/L (ref 132–146)
SODIUM BLD-SCNC: 141 MMOL/L (ref 132–146)
SPECIFIC GRAVITY UA: 1.02 (ref 1–1.03)
TOTAL PROTEIN: 6.3 G/DL (ref 6.4–8.3)
TOTAL PROTEIN: 7.4 G/DL (ref 6.4–8.3)
TRIGL SERPL-MCNC: 157 MG/DL (ref 0–149)
TSH SERPL DL<=0.05 MIU/L-ACNC: 0.9 UIU/ML (ref 0.27–4.2)
UROBILINOGEN, URINE: 0.2 E.U./DL
VLDLC SERPL CALC-MCNC: 31 MG/DL
WBC # BLD: 5.7 E9/L (ref 4.5–11.5)

## 2019-05-20 PROCEDURE — 94640 AIRWAY INHALATION TREATMENT: CPT

## 2019-05-20 PROCEDURE — 84443 ASSAY THYROID STIM HORMONE: CPT

## 2019-05-20 PROCEDURE — 6370000000 HC RX 637 (ALT 250 FOR IP): Performed by: FAMILY MEDICINE

## 2019-05-20 PROCEDURE — G0378 HOSPITAL OBSERVATION PER HR: HCPCS

## 2019-05-20 PROCEDURE — 96375 TX/PRO/DX INJ NEW DRUG ADDON: CPT

## 2019-05-20 PROCEDURE — 2500000003 HC RX 250 WO HCPCS: Performed by: NURSE PRACTITIONER

## 2019-05-20 PROCEDURE — 96374 THER/PROPH/DIAG INJ IV PUSH: CPT

## 2019-05-20 PROCEDURE — 83690 ASSAY OF LIPASE: CPT

## 2019-05-20 PROCEDURE — 83605 ASSAY OF LACTIC ACID: CPT

## 2019-05-20 PROCEDURE — 2580000003 HC RX 258: Performed by: NURSE PRACTITIONER

## 2019-05-20 PROCEDURE — 80061 LIPID PANEL: CPT

## 2019-05-20 PROCEDURE — 96376 TX/PRO/DX INJ SAME DRUG ADON: CPT

## 2019-05-20 PROCEDURE — 96367 TX/PROPH/DG ADDL SEQ IV INF: CPT

## 2019-05-20 PROCEDURE — 96366 THER/PROPH/DIAG IV INF ADDON: CPT

## 2019-05-20 PROCEDURE — 36415 COLL VENOUS BLD VENIPUNCTURE: CPT

## 2019-05-20 PROCEDURE — 85025 COMPLETE CBC W/AUTO DIFF WBC: CPT

## 2019-05-20 PROCEDURE — 87040 BLOOD CULTURE FOR BACTERIA: CPT

## 2019-05-20 PROCEDURE — 82105 ALPHA-FETOPROTEIN SERUM: CPT

## 2019-05-20 PROCEDURE — 96365 THER/PROPH/DIAG IV INF INIT: CPT

## 2019-05-20 PROCEDURE — 80053 COMPREHEN METABOLIC PANEL: CPT

## 2019-05-20 PROCEDURE — C9113 INJ PANTOPRAZOLE SODIUM, VIA: HCPCS | Performed by: FAMILY MEDICINE

## 2019-05-20 PROCEDURE — 94664 DEMO&/EVAL PT USE INHALER: CPT

## 2019-05-20 PROCEDURE — 86301 IMMUNOASSAY TUMOR CA 19-9: CPT

## 2019-05-20 PROCEDURE — 81003 URINALYSIS AUTO W/O SCOPE: CPT

## 2019-05-20 PROCEDURE — 6360000002 HC RX W HCPCS: Performed by: FAMILY MEDICINE

## 2019-05-20 PROCEDURE — 99285 EMERGENCY DEPT VISIT HI MDM: CPT

## 2019-05-20 PROCEDURE — 73502 X-RAY EXAM HIP UNI 2-3 VIEWS: CPT

## 2019-05-20 PROCEDURE — 6360000002 HC RX W HCPCS: Performed by: NURSE PRACTITIONER

## 2019-05-20 PROCEDURE — 2580000003 HC RX 258: Performed by: FAMILY MEDICINE

## 2019-05-20 PROCEDURE — 74176 CT ABD & PELVIS W/O CONTRAST: CPT

## 2019-05-20 PROCEDURE — 2500000003 HC RX 250 WO HCPCS: Performed by: FAMILY MEDICINE

## 2019-05-20 RX ORDER — ONDANSETRON 2 MG/ML
4 INJECTION INTRAMUSCULAR; INTRAVENOUS EVERY 8 HOURS PRN
Status: DISCONTINUED | OUTPATIENT
Start: 2019-05-20 | End: 2019-05-22 | Stop reason: HOSPADM

## 2019-05-20 RX ORDER — 0.9 % SODIUM CHLORIDE 0.9 %
1000 INTRAVENOUS SOLUTION INTRAVENOUS ONCE
Status: COMPLETED | OUTPATIENT
Start: 2019-05-20 | End: 2019-05-20

## 2019-05-20 RX ORDER — IPRATROPIUM BROMIDE AND ALBUTEROL SULFATE 2.5; .5 MG/3ML; MG/3ML
1 SOLUTION RESPIRATORY (INHALATION) EVERY 4 HOURS
Status: DISCONTINUED | OUTPATIENT
Start: 2019-05-20 | End: 2019-05-22 | Stop reason: HOSPADM

## 2019-05-20 RX ORDER — ISOSORBIDE MONONITRATE 30 MG/1
30 TABLET, EXTENDED RELEASE ORAL DAILY
Status: DISCONTINUED | OUTPATIENT
Start: 2019-05-20 | End: 2019-05-22 | Stop reason: HOSPADM

## 2019-05-20 RX ORDER — OXYCODONE HYDROCHLORIDE AND ACETAMINOPHEN 5; 325 MG/1; MG/1
1.5 TABLET ORAL EVERY 6 HOURS PRN
Status: DISCONTINUED | OUTPATIENT
Start: 2019-05-20 | End: 2019-05-22 | Stop reason: HOSPADM

## 2019-05-20 RX ORDER — FENTANYL CITRATE 50 UG/ML
100 INJECTION, SOLUTION INTRAMUSCULAR; INTRAVENOUS ONCE
Status: COMPLETED | OUTPATIENT
Start: 2019-05-20 | End: 2019-05-20

## 2019-05-20 RX ORDER — OXYCODONE AND ACETAMINOPHEN 7.5; 325 MG/1; MG/1
1 TABLET ORAL EVERY 6 HOURS PRN
Status: DISCONTINUED | OUTPATIENT
Start: 2019-05-20 | End: 2019-05-20 | Stop reason: CLARIF

## 2019-05-20 RX ORDER — CARVEDILOL 25 MG/1
25 TABLET ORAL 2 TIMES DAILY WITH MEALS
Status: DISCONTINUED | OUTPATIENT
Start: 2019-05-20 | End: 2019-05-22 | Stop reason: HOSPADM

## 2019-05-20 RX ORDER — AMLODIPINE BESYLATE 5 MG/1
5 TABLET ORAL DAILY
Status: DISCONTINUED | OUTPATIENT
Start: 2019-05-20 | End: 2019-05-22 | Stop reason: HOSPADM

## 2019-05-20 RX ORDER — SODIUM CHLORIDE 450 MG/100ML
INJECTION, SOLUTION INTRAVENOUS CONTINUOUS
Status: DISCONTINUED | OUTPATIENT
Start: 2019-05-20 | End: 2019-05-22 | Stop reason: HOSPADM

## 2019-05-20 RX ORDER — LEVOFLOXACIN 5 MG/ML
500 INJECTION, SOLUTION INTRAVENOUS EVERY 24 HOURS
Status: DISCONTINUED | OUTPATIENT
Start: 2019-05-20 | End: 2019-05-22 | Stop reason: HOSPADM

## 2019-05-20 RX ORDER — ONDANSETRON 2 MG/ML
4 INJECTION INTRAMUSCULAR; INTRAVENOUS ONCE
Status: COMPLETED | OUTPATIENT
Start: 2019-05-20 | End: 2019-05-20

## 2019-05-20 RX ADMIN — LEVOFLOXACIN 500 MG: 5 INJECTION, SOLUTION INTRAVENOUS at 11:49

## 2019-05-20 RX ADMIN — FENTANYL CITRATE 100 MCG: 50 INJECTION INTRAMUSCULAR; INTRAVENOUS at 02:15

## 2019-05-20 RX ADMIN — OXYCODONE HYDROCHLORIDE AND ACETAMINOPHEN 1.5 TABLET: 5; 325 TABLET ORAL at 16:38

## 2019-05-20 RX ADMIN — FENTANYL CITRATE 100 MCG: 50 INJECTION INTRAMUSCULAR; INTRAVENOUS at 01:01

## 2019-05-20 RX ADMIN — IPRATROPIUM BROMIDE AND ALBUTEROL SULFATE 1 AMPULE: .5; 3 SOLUTION RESPIRATORY (INHALATION) at 09:11

## 2019-05-20 RX ADMIN — CARVEDILOL 25 MG: 25 TABLET, FILM COATED ORAL at 08:49

## 2019-05-20 RX ADMIN — ONDANSETRON 4 MG: 2 INJECTION INTRAMUSCULAR; INTRAVENOUS at 11:56

## 2019-05-20 RX ADMIN — WATER 1 G: 1 INJECTION INTRAMUSCULAR; INTRAVENOUS; SUBCUTANEOUS at 02:15

## 2019-05-20 RX ADMIN — OXYCODONE HYDROCHLORIDE AND ACETAMINOPHEN 1.5 TABLET: 5; 325 TABLET ORAL at 22:46

## 2019-05-20 RX ADMIN — SODIUM CHLORIDE 8 MG/HR: 9 INJECTION, SOLUTION INTRAVENOUS at 05:42

## 2019-05-20 RX ADMIN — METRONIDAZOLE 500 MG: 500 INJECTION, SOLUTION INTRAVENOUS at 22:47

## 2019-05-20 RX ADMIN — CARVEDILOL 25 MG: 25 TABLET, FILM COATED ORAL at 16:40

## 2019-05-20 RX ADMIN — IPRATROPIUM BROMIDE AND ALBUTEROL SULFATE 1 AMPULE: .5; 3 SOLUTION RESPIRATORY (INHALATION) at 06:00

## 2019-05-20 RX ADMIN — ISOSORBIDE MONONITRATE 30 MG: 30 TABLET, EXTENDED RELEASE ORAL at 08:49

## 2019-05-20 RX ADMIN — ONDANSETRON 4 MG: 2 INJECTION INTRAMUSCULAR; INTRAVENOUS at 01:01

## 2019-05-20 RX ADMIN — METRONIDAZOLE 500 MG: 500 INJECTION, SOLUTION INTRAVENOUS at 02:15

## 2019-05-20 RX ADMIN — METRONIDAZOLE 500 MG: 500 INJECTION, SOLUTION INTRAVENOUS at 10:21

## 2019-05-20 RX ADMIN — AMLODIPINE BESYLATE 5 MG: 5 TABLET ORAL at 08:49

## 2019-05-20 RX ADMIN — IPRATROPIUM BROMIDE AND ALBUTEROL SULFATE 1 AMPULE: .5; 3 SOLUTION RESPIRATORY (INHALATION) at 16:32

## 2019-05-20 RX ADMIN — METRONIDAZOLE 500 MG: 500 INJECTION, SOLUTION INTRAVENOUS at 16:42

## 2019-05-20 RX ADMIN — SODIUM CHLORIDE: 4.5 INJECTION, SOLUTION INTRAVENOUS at 05:43

## 2019-05-20 RX ADMIN — SODIUM CHLORIDE 1000 ML: 9 INJECTION, SOLUTION INTRAVENOUS at 01:01

## 2019-05-20 RX ADMIN — SODIUM CHLORIDE 8 MG/HR: 9 INJECTION, SOLUTION INTRAVENOUS at 14:49

## 2019-05-20 RX ADMIN — IPRATROPIUM BROMIDE AND ALBUTEROL SULFATE 1 AMPULE: .5; 3 SOLUTION RESPIRATORY (INHALATION) at 13:04

## 2019-05-20 RX ADMIN — OXYCODONE HYDROCHLORIDE AND ACETAMINOPHEN 1.5 TABLET: 5; 325 TABLET ORAL at 10:00

## 2019-05-20 RX ADMIN — SODIUM CHLORIDE: 4.5 INJECTION, SOLUTION INTRAVENOUS at 16:38

## 2019-05-20 ASSESSMENT — PAIN DESCRIPTION - ORIENTATION
ORIENTATION: RIGHT

## 2019-05-20 ASSESSMENT — PAIN - FUNCTIONAL ASSESSMENT: PAIN_FUNCTIONAL_ASSESSMENT: PREVENTS OR INTERFERES WITH MANY ACTIVE NOT PASSIVE ACTIVITIES

## 2019-05-20 ASSESSMENT — PAIN DESCRIPTION - PAIN TYPE
TYPE: ACUTE PAIN

## 2019-05-20 ASSESSMENT — PAIN DESCRIPTION - FREQUENCY: FREQUENCY: CONTINUOUS

## 2019-05-20 ASSESSMENT — PAIN DESCRIPTION - ONSET: ONSET: SUDDEN

## 2019-05-20 ASSESSMENT — COPD QUESTIONNAIRES: CAT_SEVERITY: NO INTERVAL CHANGE

## 2019-05-20 ASSESSMENT — PAIN SCALES - GENERAL
PAINLEVEL_OUTOF10: 10
PAINLEVEL_OUTOF10: 6
PAINLEVEL_OUTOF10: 6
PAINLEVEL_OUTOF10: 7
PAINLEVEL_OUTOF10: 7
PAINLEVEL_OUTOF10: 10
PAINLEVEL_OUTOF10: 6
PAINLEVEL_OUTOF10: 3
PAINLEVEL_OUTOF10: 10

## 2019-05-20 ASSESSMENT — PAIN DESCRIPTION - DESCRIPTORS
DESCRIPTORS: ACHING
DESCRIPTORS: ACHING;DISCOMFORT;SPASM
DESCRIPTORS: ACHING

## 2019-05-20 ASSESSMENT — PAIN DESCRIPTION - PROGRESSION
CLINICAL_PROGRESSION: GRADUALLY WORSENING
CLINICAL_PROGRESSION: NOT CHANGED

## 2019-05-20 ASSESSMENT — PAIN DESCRIPTION - LOCATION
LOCATION: HIP
LOCATION: HIP;KNEE
LOCATION: HIP

## 2019-05-20 NOTE — PLAN OF CARE
Problem: Falls - Risk of:  Goal: Will remain free from falls  Description  Will remain free from falls  Outcome: Met This Shift     Problem: Falls - Risk of:  Goal: Absence of physical injury  Description  Absence of physical injury  Outcome: Met This Shift     Problem: Pain:  Goal: Pain level will decrease  Description  Pain level will decrease  Outcome: Not Met This Shift     Problem: Pain:  Goal: Control of acute pain  Description  Control of acute pain  Outcome: Met This Shift     Problem: Pain:  Goal: Control of chronic pain  Description  Control of chronic pain  Outcome: Met This Shift

## 2019-05-20 NOTE — PLAN OF CARE
Problem: Inadequate oral food/beverage intake (NI-2.1)  Goal: Food and/or Nutrient Delivery  Description  Individualized approach for food/nutrient provision.   Outcome: Not Met This Shift

## 2019-05-20 NOTE — PLAN OF CARE
Problem: Pain:  Goal: Pain level will decrease  Description  Pain level will decrease  5/20/2019 1312 by Martinez Cordoba RN  Outcome: Not Met This Shift  Note:   Pain goal a \"0\" after pain medicine,reports hip pain a \"7\"-on Percocet 1 1/2 tabs every 6 hours as needed,which pt. Says she is on at home,not helping-she did speak to Dr Destini Garcia about this. Consult with Dr Gifty Kim for hip pain,xray was ordered.   5/20/2019 0816 by Angel Guevara RN  Outcome: Not Met This Shift  Goal: Control of acute pain  Description  Control of acute pain  5/20/2019 1312 by Martinez Cordoba RN  Outcome: Not Met This Shift  5/20/2019 0816 by Angel Guevara RN  Outcome: Met This Shift  Goal: Control of chronic pain  Description  Control of chronic pain  5/20/2019 1312 by Martinez Cordoba RN  Outcome: Not Met This Shift  5/20/2019 0816 by Angel Guevara RN  Outcome: Met This Shift

## 2019-05-20 NOTE — PROGRESS NOTES
P Quality Flow/Interdisciplinary Rounds Progress Note        Quality Flow Rounds held on May 20, 2019    Disciplines Attending:  Bedside Nurse, ,  and Nursing Unit [address] was admitted on 5/20/2019 12:10 AM    Anticipated Discharge Date:       Disposition:    Esequiel Score:  Esequiel Scale Score: 22    Readmission Risk              Risk of Unplanned Readmission:        10           Discussed patient goal for the day, patient clinical progression, and barriers to discharge.   The following Goal(s) of the Day/Commitment(s) have been identified:  Continue to monitor pain tolerance      [facility]  May 20, 2019

## 2019-05-20 NOTE — ED PROVIDER NOTES
Arthritis in her father and mother; Asthma in her father and mother; Cancer in her father and mother; Depression in her father and mother; Diabetes in her father and mother; Heart Disease in her father and mother; High Blood Pressure in her father and mother; High Cholesterol in her father and mother. Allergies: Aspirin; Prochlorperazine edisylate; Tegretol [carbamazepine]; and Compazine [prochlorperazine maleate]    Physical Exam                   ED Triage Vitals [05/19/19 2347]   BP Temp Temp Source Pulse Resp SpO2 Height Weight   (!) 100/51 97.6 °F (36.4 °C) Oral 70 18 93 % 5' 3\" (1.6 m) 174 lb 5 oz (79.1 kg)      Oxygen Saturation Interpretation: Normal.    Constitutional:  Alert, development consistent with age. HEENT:  NC/NT. Airway patent. Neck:  Normal ROM. Supple. Respiratory:  Clear to auscultation and breath sounds equal.  CV:  Regular rate and rhythm, normal heart sounds, without pathological murmurs, ectopy, gallops, or rubs. GI:  General Appearance: normal.       Bowel sounds: normal bowel sounds. Distension:  None. Tenderness: No abdominal tenderness. Liver: non-tender. Spleen:  non-palpable and non-tender. Pulsatile Mass: absent. Hernia:  no inguinal or femoral hernias noted. Back: CVA Tenderness: No.  Musculoskeletal: Right hip. Anterior knee mild tenderness with no erythema, edema, abrasions, ecchymosis. No deformity. Full range of motion with pain. Integument:  Normal turgor. Warm, dry, without visible rash, unless noted elsewhere. Lymphatics: No lymphangitis or adenopathy noted. Neurological:  Oriented. Motor functions intact.     Lab / Imaging Results   (All laboratory and radiology results have been personally reviewed by myself)  Labs:  Results for orders placed or performed during the hospital encounter of 05/20/19   Comprehensive Metabolic Panel   Result Value Ref Range    Sodium 138 132 - 146 mmol/L    Potassium 4.2 3.5 - 5.0 mmol/L    Chloride 102 98 - 107 mmol/L    CO2 23 22 - 29 mmol/L    Anion Gap 13 7 - 16 mmol/L    Glucose 126 (H) 74 - 99 mg/dL    BUN 23 8 - 23 mg/dL    CREATININE 1.8 (H) 0.5 - 1.0 mg/dL    GFR Non-African American 35 >=60 mL/min/1.73    GFR African American 35     Calcium 9.3 8.6 - 10.2 mg/dL    Total Protein 7.4 6.4 - 8.3 g/dL    Alb 4.0 3.5 - 5.2 g/dL    Total Bilirubin 0.3 0.0 - 1.2 mg/dL    Alkaline Phosphatase 87 35 - 104 U/L   CBC Auto Differential   Result Value Ref Range    WBC 5.7 4.5 - 11.5 E9/L    RBC 4.10 3.50 - 5.50 E12/L    Hemoglobin 11.5 11.5 - 15.5 g/dL    Hematocrit 36.9 34.0 - 48.0 %    MCV 90.0 80.0 - 99.9 fL    MCH 28.0 26.0 - 35.0 pg    MCHC 31.2 (L) 32.0 - 34.5 %    RDW 13.9 11.5 - 15.0 fL    Platelets 681 152 - 830 E9/L    MPV 10.0 7.0 - 12.0 fL    Neutrophils % 39.6 (L) 43.0 - 80.0 %    Immature Granulocytes % 0.3 0.0 - 5.0 %    Lymphocytes % 50.4 (H) 20.0 - 42.0 %    Monocytes % 7.7 2.0 - 12.0 %    Eosinophils % 1.7 0.0 - 6.0 %    Basophils % 0.3 0.0 - 2.0 %    Neutrophils # 2.26 1.80 - 7.30 E9/L    Immature Granulocytes # 0.02 E9/L    Lymphocytes # 2.89 1.50 - 4.00 E9/L    Monocytes # 0.44 0.10 - 0.95 E9/L    Eosinophils # 0.10 0.05 - 0.50 E9/L    Basophils # 0.02 0.00 - 0.20 E9/L   Lipase   Result Value Ref Range    Lipase 18 13 - 60 U/L   Urinalysis   Result Value Ref Range    Color, UA Yellow Straw/Yellow    Clarity, UA Clear Clear    Glucose, Ur Negative Negative mg/dL    Bilirubin Urine Negative Negative    Ketones, Urine Negative Negative mg/dL    Specific Gravity, UA 1.020 1.005 - 1.030    Blood, Urine Negative Negative    pH, UA 5.5 5.0 - 9.0    Protein, UA Negative Negative mg/dL    Urobilinogen, Urine 0.2 <2.0 E.U./dL    Nitrite, Urine Negative Negative    Leukocyte Esterase, Urine Negative Negative   Lactic acid, plasma   Result Value Ref Range    Lactic Acid 1.5 0.5 - 2.2 mmol/L       Imaging:   All Radiology results interpreted by Radiologist unless otherwise noted.  CT ABDOMEN PELVIS WO CONTRAST    (Results Pending)     ED Course / Medical Decision Making     Medications   cefTRIAXone (ROCEPHIN) 1 g in sterile water 10 mL IV syringe (has no administration in time range)   metronidazole (FLAGYL) 500 mg in NaCl 100 mL IVPB premix (has no administration in time range)   fentaNYL (SUBLIMAZE) injection 100 mcg (has no administration in time range)   0.9 % sodium chloride bolus (1,000 mLs Intravenous New Bag 5/20/19 0101)   fentaNYL (SUBLIMAZE) injection 100 mcg (100 mcg Intravenous Given 5/20/19 0101)   ondansetron (ZOFRAN) injection 4 mg (4 mg Intravenous Given 5/20/19 0101)        Re-examination:  5/20/19       Time: 0130   Patient resting with symptoms minimally improved. Will re-dose with pain medication. Consult(s):   IP CONSULT TO PRIMARY CARE PROVIDER  0145: Spoke with Dr. Dot Mcqueen and the patient will be admitted for further treatment and observation. Procedure(s):   none. MDM:   Patient presented with right flank pain, nausea, and right hip and knee pain. Her diagnostics reviewed and discussed with her primary care provider. Patient was found to have clinical appearance of colitis and acute kidney injury. Patient was initiated on Rocephin and Flagyl in the emergency department. She was hydrated with IV fluid. Patient will be admitted for further treatment and observation. Counseling: The emergency provider has spoken with the patient and discussed todays results, in addition to providing specific details for the plan of care and counseling regarding the diagnosis and prognosis. Questions are answered at this time and they are agreeable with the plan. Assessment      1. Colitis    2.  KRAIG (acute kidney injury) ([facility] [address]      Plan   Admit to med/surg floor  Patient condition is stable    New Medications     New Prescriptions    No medications on file     Electronically signed by LUCHO Mills CNP   DD: 5/20/19  **This report was transcribed using voice recognition software. Every effort was made to ensure accuracy; however, inadvertent computerized transcription errors may be present.   END OF ED PROVIDER NOTE       LUCHO Thomas - PERI  05/20/19 0155

## 2019-05-20 NOTE — PROGRESS NOTES
Nutrition Assessment    Type and Reason for Visit: Initial, Positive Nutrition Screen    Nutrition Recommendations: Continue current diet, Start ONS(Ensure HP BID)    Nutrition Assessment: Unable to visit pt despite multiple attempts 2/2 pt not in room. Pt is at risk of nutritional compromise AEB pt report of poor intake for >2 days 2/2 early satiety, nausea, and abd pain. Recommend Ensure HP BID to provide: 320 kcal and 32 gm protein daily. Malnutrition Assessment:  · Malnutrition Status: Insufficient data  · Context: Acute illness or injury  · Findings of the 6 clinical characteristics of malnutrition (Minimum of 2 out of 6 clinical characteristics is required to make the diagnosis of moderate or severe Protein Calorie Malnutrition based on AND/ASPEN Guidelines):  1. Energy Intake-Less than or equal to 75% of estimated energy requirement, (2 days)    2. Weight Loss-No significant weight loss, in 6 months  3. Fat Loss-Unable to assess,    4. Muscle Loss-Unable to assess,    5. Fluid Accumulation-No significant fluid accumulation,    6.  Strength-Not measured    Nutrition Risk Level:  Moderate    Nutrient Needs:  · Estimated Daily Total Kcal: 7345-2665(MSJ REE= 1330 x 1.2)  · Estimated Daily Protein (g): 65-75(1.2-1.4 gm/kg IBW)  · Estimated Daily Total Fluid (ml/day): 4648-6746(1 ml/kcal)    Nutrition Diagnosis:   · Problem: Inadequate oral intake  · Etiology: related to Early satiety     Signs and symptoms:  as evidenced by Patient report of, Diet history of poor intake    Objective Information:  · Nutrition-Focused Physical Findings: pt a/o x 4, abd tender, nausea, loss of appetite, +BS, no edema, +1L I/O, hyperglycemia, no recent A1C  · Wound Type: None  · Current Nutrition Therapies:  · Oral Diet Orders: Cardiac   · Oral Diet intake: 51-75%(1 meal recorded)  · Oral Nutrition Supplement (ONS) Orders: None  · Anthropometric Measures:  · Ht: 5' 3\" (160 cm)   · Current Body Wt: 174 lb 5 oz (79.1 kg)(5/19 actual )  · Admission Body Wt: 174 lb 5 oz (79.1 kg)(5/19 actual)  · Usual Body Wt: 185 lb (83.9 kg)(10/19/18 actual, 192 lb 6.4 oz (8/17/18) no method per EMR)  · % Weight Change:  ,  9% wt loss in 9 months, this is not significant   · Ideal Body Wt: 115 lb (52.2 kg), % Ideal Body 152%  · BMI Classification: BMI 30.0 - 34.9 Obese Class I    Nutrition Interventions:   Continue current diet, Start ONS(Ensure HP BID)  Continued Inpatient Monitoring, Coordination of Care    Nutrition Evaluation:   · Evaluation: Goals set   · Goals: pt is to consume >75% of most meals/ONS    · Monitoring: Meal Intake, Supplement Intake, Diet Tolerance, Skin Integrity, I&O, Weight, Pertinent Labs, Nausea or Vomiting, Monitor Bowel Function      Electronically signed by Isael Leroy RD, LD on 5/20/19 at 4:06 PM    Contact Number: [phone]

## 2019-05-20 NOTE — PROGRESS NOTES
Patient seen and examined. Full consult to follow. The right hip pain does not appear to be articular in nature. She has no pain with log roll of the hip. Will MRI the hip rule out other injuries.

## 2019-05-20 NOTE — ANESTHESIA PRE PROCEDURE
05/22/19 0730 8 mg/hr at 05/22/19 0730    ipratropium-albuterol (DUONEB) nebulizer solution 1 ampule  1 ampule Inhalation Q4H Benedicto Medellin MD   1 ampule at 05/21/19 1010    amLODIPine (NORVASC) tablet 5 mg  5 mg Oral Daily Benedicto Medellin MD   Stopped at 05/22/19 0824    carvedilol (COREG) tablet 25 mg  25 mg Oral BID WC Benedicto Medellin MD   Stopped at 05/22/19 0825    isosorbide mononitrate (IMDUR) extended release tablet 30 mg  30 mg Oral Daily Benedicto Medellin MD   Stopped at 05/22/19 0825    oxyCODONE-acetaminophen (PERCOCET) 5-325 MG per tablet 1.5 tablet  1.5 tablet Oral Q6H PRN Benedicto Medellin MD   1.5 tablet at 05/21/19 1507    levofloxacin (LEVAQUIN) 500 MG/100ML infusion 500 mg  500 mg Intravenous Q24H Benedicto Medellin MD   Stopped at 05/22/19 1110    metronidazole (FLAGYL) 500 mg in NaCl 100 mL IVPB premix  500 mg Intravenous Mildred Moss MD   Stopped at 05/22/19 1300       Allergies:     Allergies   Allergen Reactions    Aspirin Swelling    Prochlorperazine Edisylate Swelling    Tegretol [Carbamazepine] Swelling    Compazine [Prochlorperazine Maleate]        Problem List:    Patient Active Problem List   Diagnosis Code    Herniated lumbar intervertebral disc M51.26    Degenerative arthritis of lumbar spine M47.816    Anxiety F41.9    Mass of right lobe of liver R16.0    Back pain M54.9    Hypertension I10    Arthritis M19.90    MVP (mitral valve prolapse) I34.1    COPD exacerbation (HCC) J44.1    Abdominal pain R10.9    Gastritis K29.70    Acute kidney injury (HCC) N17.9    Abdominal pain, epigastric R10.13    LOC (loss of consciousness) (HCC) R40.20    Hypotension I95.9    Chest pain at rest R07.9    Chest pain R07.9    Anginal pain (HCC) I20.9    KRAIG (acute kidney injury) ([facility] [address] N17.9       Past Medical History:        Diagnosis Date    Arthritis     Bursitis     right arm    Chest pain 12/06/2016    lexiscan stress    COPD exacerbation ([facility] 2/12/2015    H/O cardiovascular stress test 10/20/2018    Lexiscan    Hypertension     MVP (mitral valve prolapse)        Past Surgical History:        Procedure Laterality Date    ABDOMEN SURGERY      APPENDECTOMY      BACK SURGERY      disc fracture in lumbar spine removed, neck same    EYE SURGERY      KNEE SURGERY      cyst on knee    LUMBAR FUSION  3/28/13    plif  L4-L5    TONSILLECTOMY      UPPER GASTROINTESTINAL ENDOSCOPY N/A 8/18/2018    EGD BIOPSY performed by Ihsan Del Valle MD at [address] History:    Social History     Tobacco Use    Smoking status: Current Every Day Smoker     Packs/day: 0.25     Years: 44.00     Pack years: 11.00     Types: Cigarettes    Smokeless tobacco: Never Used    Tobacco comment: trying to quit   Substance Use Topics    Alcohol use: Yes     Comment: very occasional                                Ready to quit: Not Answered  Counseling given: Not Answered  Comment: trying to quit      Vital Signs (Current):   Vitals:    05/20/19 1841 05/21/19 0725 05/21/19 1909 05/22/19 0740   BP: 132/60 (!) 141/74 (!) 106/48 113/64   Pulse: 69 66 61 59   Resp: 18 18 16 16   Temp: 97.8 °F (36.6 °C) 98 °F (36.7 °C) 98.1 °F (36.7 °C) 97.8 °F (36.6 °C)   TempSrc: Oral Oral Oral Oral   SpO2: 99% 96% 97% 94%   Weight:       Height:                                                  BP Readings from Last 3 Encounters:   05/22/19 113/64   03/21/19 (!) 160/73   10/22/18 (!) 101/52       NPO Status:  Patient instructed to remain NPO past midnight DOS. BMI:   Wt Readings from Last 3 Encounters:   05/19/19 174 lb 5 oz (79.1 kg)   03/21/19 174 lb (78.9 kg)   10/19/18 185 lb (83.9 kg)     Body mass index is 30.88 kg/m².     CBC:   Lab Results   Component Value Date    WBC 5.7 05/20/2019    RBC 4.10 05/20/2019    HGB 11.5 05/20/2019    HCT 36.9 05/20/2019    MCV 90.0 05/20/2019    RDW 13.9 05/20/2019     05/20/2019 regurgitation.      Aortic Valve   The aortic valve is trileaflet.   The aortic valve appears mildly sclerotic.   No evidence of aortic valve regurgitation.   No hemodynamically significant aortic stenosis is present.      Pulmonic Valve   Pulmonic valve is structurally normal.      Pericardial Effusion   No evidence of pericardial effusion.      Aorta   The aorta is within normal limits.   Miscellaneous   Regular rhythm.      Conclusions      Summary   Left ventricular size is grossly normal.   No evidence of left ventricular mass or thrombus noted.   Normal left ventricular wall thickness.   Ejection fraction is measured at 58%.   No regional wall motion abnormalities seen.   Normal sized left atrium.   Interatrial septum appears intact.   No evidence of thrombus within left atrium.   Physiologic and/or trace mitral regurgitation is present.   No mitral valve stenosis present.   Physiologic and/or trace tricuspid regurgitation.   Regular rhythm. CT Abdomen 5/20/2019  Impression   1. No acute findings. 2. Hypodense lesion in the liver again noted poorly assessed on   noncontrast study.          Anesthesia Evaluation  Patient summary reviewed and Nursing notes reviewed no history of anesthetic complications:   Airway: Mallampati: II  TM distance: >3 FB   Neck ROM: full  Mouth opening: > = 3 FB Dental:      Comment: Patient denies any removable, or chipped teeth    Pulmonary:   (+) COPD: no interval change,  decreased breath sounds,                             Cardiovascular:  Exercise tolerance: good (>4 METS),   (+) hypertension:, valvular problems/murmurs: MVP, angina: no interval change,       ECG reviewed  Rhythm: regular  Rate: normal  Echocardiogram reviewed         Beta Blocker:  Dose within 24 Hrs      ROS comment: LOC (loss of consciousness)     Neuro/Psych:   (+) seizures (per patient ten years ago): no interval change, depression/anxiety              ROS comment: Herniated lumbar intervertebral disc  Back pain GI/Hepatic/Renal:   (+) renal disease: ARF,          ROS comment: Gastritis  Abdominal pain. Endo/Other:    (+) : arthritis: no interval change. , . Abdominal:           Vascular: negative vascular ROS. Anesthesia Plan      MAC     ASA 3     (R hand 22)  Induction: intravenous. Anesthetic plan and risks discussed with patient. Use of blood products discussed with patient whom consented to blood products. Plan discussed with CRNA and attending. DOS STAFF ADDENDUM:    Pt seen and examined, chart reviewed (including anesthesia, drug and allergy history). Anesthetic plan, risks, benefits, alternatives, and personnel involved discussed with patient. Patient verbalized an understanding and agrees to proceed. Plan discussed with care team members and agreed upon.     Jade Hadley MD  Staff Anesthesiologist  2:50 PM      Jade Hadley MD   5/22/2019

## 2019-05-20 NOTE — CONSULTS
Stacey Ferreira M.D. [facility]  Dr. Kaylyn Bernheim, M.D.,  Dr. Syd Braun M.D.,  Dr. Mimi Campbell D.O.,  Dr Sheridan Lindsey D.O. ,  Dr Reji Rose M.D. Hilario Fang  60 y.o.  female      Re:abd pain, liver mass  Requesting physician:Dr Karol Spicer  Date:1:23 PM 5/20/2019          HPI: 60-year-old female patient presenting to the hospital yesterday complaining of right flank pain radiating to the right groin. Patient also reports epigastric and right upper quadrant pain and discomfort associated with nausea. Patient denies emesis including hematemesis or emesis of coffee-ground material. Patient denies diarrhea. She denies blood in the stool or melena. Patient denies fever or chills. She denies dietary indiscretion/sick contacts/new medications or recent travel. Patient complains of feeling of fullness - she complains of some upper chest and neck discomfort however she cannot describe it. Patient reports that her nausea as well as discomfort is mostly associated with meals. Pertinently patient had upper endoscopy in August 2018 in the hospital which was virtually unremarkable. According to medical records patient had colonoscopy 5-6 years ago however she cannot provide details - it appears that procedure was performed at Dr. Zandra Huerta office. \  In 2018 MRI of the liver was obtained because of the liver lesion identifying enhancing lesion measuring 4.2 x 2.2 cm with recommendation for IV contrasted study it 6 months. On this admission, noncontrasted CT scan revealed persistent liver lesion. Additional laboratory work revealed no significant leukocytosis; no significant anemia; no significant thrombocytopenia. Liver profile shows no obstructive pattern with minimally elevated AST on presentation of 35. Patient chemistry panel is significant for what appeared to be worsening renal function with creatinine of 1.8 on presentation and baseline of 1.1-1.2 previously.     Information sources:   -Patient  -medical record  -health care team    PMHx:  Past Medical History:   Diagnosis Date    Arthritis     Bursitis     right arm    Chest pain 12/06/2016    lexiscan stress    COPD exacerbation ([address] 2/12/2015    H/O cardiovascular stress test 10/20/2018    Lexiscan    Hypertension     MVP (mitral valve prolapse)        PSHx:  Past Surgical History:   Procedure Laterality Date    ABDOMEN SURGERY      APPENDECTOMY      BACK SURGERY      disc fracture in lumbar spine removed, neck same    EYE SURGERY      KNEE SURGERY      cyst on knee    LUMBAR FUSION  3/28/13    plif  L4-L5    TONSILLECTOMY      UPPER GASTROINTESTINAL ENDOSCOPY N/A 8/18/2018    EGD BIOPSY performed by Malcolm Wagner MD at [facility]:  Current Facility-Administered Medications   Medication Dose Route Frequency Provider Last Rate Last Dose    ondansetron (ZOFRAN) injection 4 mg  4 mg Intravenous Q8H PRN Stephanie Gant MD   4 mg at 05/20/19 1156    0.45 % sodium chloride infusion   Intravenous Continuous Stephanie Gant  mL/hr at 05/20/19 0543      pantoprazole (PROTONIX) 80 mg in sodium chloride 0.9 % 100 mL infusion  8 mg/hr Intravenous Continuous Stephanie Gant MD 10 mL/hr at 05/20/19 0542 8 mg/hr at 05/20/19 0542    ipratropium-albuterol (DUONEB) nebulizer solution 1 ampule  1 ampule Inhalation Q4H Stephanie Gant MD   1 ampule at 05/20/19 1304    amLODIPine (NORVASC) tablet 5 mg  5 mg Oral Daily Stephanie Gant MD   5 mg at 05/20/19 0849    carvedilol (COREG) tablet 25 mg  25 mg Oral BID WC Stephanie Gant MD   25 mg at 05/20/19 0849    isosorbide mononitrate (IMDUR) extended release tablet 30 mg  30 mg Oral Daily Stephanie Gant MD   30 mg at 05/20/19 0849    oxyCODONE-acetaminophen (PERCOCET) 5-325 MG per tablet 1.5 tablet  1.5 tablet Oral Q6H PRN Stephanie Gant MD   1.5 tablet at 05/20/19 1000    levofloxacin (LEVAQUIN) 500 MG/100ML infusion 500 mg  500 mg Intravenous Q24H Juan RAMEY Pratibha Kingsley MD   Stopped at 05/20/19 1301    metronidazole (FLAGYL) 500 mg in NaCl 100 mL IVPB premix  500 mg Intravenous Fuentes Peguero MD   Stopped at 05/20/19 1130       SocHx:  Social History     Socioeconomic History    Marital status:      Spouse name: Not on file    Number of children: 1    Years of education: Not on file    Highest education level: Not on file   Occupational History    Not on file   Social Needs    Financial resource strain: Not on file    Food insecurity:     Worry: Not on file     Inability: Not on file    Transportation needs:     Medical: Not on file     Non-medical: Not on file   Tobacco Use    Smoking status: Current Every Day Smoker     Packs/day: 0.25     Years: 44.00     Pack years: 11.00     Types: Cigarettes    Smokeless tobacco: Never Used    Tobacco comment: trying to quit   Substance and Sexual Activity    Alcohol use: Yes     Comment: very occasional    Drug use: No    Sexual activity: Never   Lifestyle    Physical activity:     Days per week: Not on file     Minutes per session: Not on file    Stress: Not on file   Relationships    Social connections:     Talks on phone: Not on file     Gets together: Not on file     Attends Baptism service: Not on file     Active member of club or organization: Not on file     Attends meetings of clubs or organizations: Not on file     Relationship status: Not on file    Intimate partner violence:     Fear of current or ex partner: Not on file     Emotionally abused: Not on file     Physically abused: Not on file     Forced sexual activity: Not on file   Other Topics Concern    Not on file   Social History Narrative    Not on file       FamHx:  Family History   Problem Relation Age of Onset    Cancer Mother     Arthritis Mother     Asthma Mother     Depression Mother     Diabetes Mother     Heart Disease Mother     High Blood Pressure Mother     High Cholesterol Mother     Arthritis Father     Asthma Father     Cancer Father     Depression Father     Diabetes Father     Heart Disease Father     High Blood Pressure Father     High Cholesterol Father        Allergy:  Allergies   Allergen Reactions    Aspirin Swelling    Prochlorperazine Edisylate Swelling    Tegretol [Carbamazepine] Swelling    Compazine [Prochlorperazine Maleate]          ROS: As described in the HPI and in addition is negative upon detailed review of systems or unobtainable unless otherwise stated in this dictation. PE:  /62   Pulse 69   Temp 97.7 °F (36.5 °C) (Oral)   Resp 18   Ht 5' 3\" (1.6 m)   Wt 174 lb 5 oz (79.1 kg)   LMP 03/01/2006   SpO2 99%   BMI 30.88 kg/m²     Gen.: NAD/obese -American female  Head: Atraumatic/normocephalic  Eyes: EOM I/anicteric sclerae/no conjunctival erythema  ENT: Moist oral mucosa/no discharge Celsius  Neck: Supple/trachea midline/no JVD  Chest: CTA B/symmetrical excursions  Cor:RRR//S1-S2 appreciated without gallop  Abd.: Soft and obese. Appears distended into epigastrium/RUQ. No rebound tenderness or guarding.  BS +/4  Extr.: No peripheral edema  Muscles: Slightly decreased tone and bulk, consistent with age and condition  Skin: Warm and dry/anicteric      DATA:  Stool (measured) : 0 mL  Lab Results   Component Value Date    WBC 5.7 05/20/2019    RBC 4.10 05/20/2019    HGB 11.5 05/20/2019    HCT 36.9 05/20/2019    MCV 90.0 05/20/2019    MCH 28.0 05/20/2019    MCHC 31.2 05/20/2019    RDW 13.9 05/20/2019     05/20/2019    MPV 10.0 05/20/2019     Lab Results   Component Value Date     05/20/2019    K 3.6 05/20/2019     05/20/2019    CO2 24 05/20/2019    BUN 22 05/20/2019    CREATININE 1.6 05/20/2019    CALCIUM 8.8 05/20/2019    PROT 6.3 05/20/2019    LABALBU 3.5 05/20/2019    LABALBU 3.8 08/30/2011    BILITOT <0.2 05/20/2019    ALKPHOS 84 05/20/2019    AST 23 05/20/2019    ALT 13 05/20/2019     Lab Results   Component Value Date    LIPASE 18 05/20/2019     Lab Results   Component Value Date    AMYLASE 37 08/18/2018         ASSESSMENT/PLAN:  1. Abdominal pain  -Unremarkable EGD August 2018  -May consider de-escalating PPI dosing  -Consider repeat upper endoscopy  -Colonoscopy likely as outpatient - obtain records from previous seizures      2. Liver mass  -Stable appearance of current time  - Consider biopsy    3. Hip pain  -Await Ortho input    Above has been discussed with the patient and all questions answered to her sensation. She is agreeable with the plan as delineated. Thank you for the opportunity to see this patient in consultation      NOTE:  This report was transcribed using voice recognition software. Every effort was made to ensure accuracy; however, inadvertent computerized transcription errors may be present.     Corry Huitron MD  5/20/2019  1:23 PM

## 2019-05-21 ENCOUNTER — ANESTHESIA (OUTPATIENT)
Dept: ENDOSCOPY | Age: 59
End: 2019-05-21
Payer: COMMERCIAL

## 2019-05-21 ENCOUNTER — APPOINTMENT (OUTPATIENT)
Dept: MRI IMAGING | Age: 59
End: 2019-05-21
Payer: COMMERCIAL

## 2019-05-21 LAB
FERRITIN: 172 NG/ML
IRON SATURATION: 30 % (ref 15–50)
IRON: 69 MCG/DL (ref 37–145)
TOTAL IRON BINDING CAPACITY: 231 MCG/DL (ref 250–450)

## 2019-05-21 PROCEDURE — 2580000003 HC RX 258

## 2019-05-21 PROCEDURE — 36415 COLL VENOUS BLD VENIPUNCTURE: CPT

## 2019-05-21 PROCEDURE — 6370000000 HC RX 637 (ALT 250 FOR IP): Performed by: FAMILY MEDICINE

## 2019-05-21 PROCEDURE — 82728 ASSAY OF FERRITIN: CPT

## 2019-05-21 PROCEDURE — 96366 THER/PROPH/DIAG IV INF ADDON: CPT

## 2019-05-21 PROCEDURE — 2500000003 HC RX 250 WO HCPCS: Performed by: FAMILY MEDICINE

## 2019-05-21 PROCEDURE — 73721 MRI JNT OF LWR EXTRE W/O DYE: CPT

## 2019-05-21 PROCEDURE — G0378 HOSPITAL OBSERVATION PER HR: HCPCS

## 2019-05-21 PROCEDURE — 6360000002 HC RX W HCPCS: Performed by: FAMILY MEDICINE

## 2019-05-21 PROCEDURE — 83540 ASSAY OF IRON: CPT

## 2019-05-21 PROCEDURE — 96376 TX/PRO/DX INJ SAME DRUG ADON: CPT

## 2019-05-21 PROCEDURE — 83550 IRON BINDING TEST: CPT

## 2019-05-21 PROCEDURE — 94640 AIRWAY INHALATION TREATMENT: CPT

## 2019-05-21 RX ORDER — MORPHINE SULFATE 10 MG/ML
5 INJECTION, SOLUTION INTRAMUSCULAR; INTRAVENOUS EVERY 6 HOURS PRN
Status: DISCONTINUED | OUTPATIENT
Start: 2019-05-21 | End: 2019-05-22 | Stop reason: HOSPADM

## 2019-05-21 RX ORDER — SODIUM CHLORIDE 0.9 % (FLUSH) 0.9 %
SYRINGE (ML) INJECTION
Status: COMPLETED
Start: 2019-05-21 | End: 2019-05-21

## 2019-05-21 RX ORDER — SODIUM CHLORIDE 0.9 % (FLUSH) 0.9 %
SYRINGE (ML) INJECTION
Status: DISPENSED
Start: 2019-05-21 | End: 2019-05-22

## 2019-05-21 RX ADMIN — CARVEDILOL 25 MG: 25 TABLET, FILM COATED ORAL at 17:14

## 2019-05-21 RX ADMIN — MORPHINE SULFATE 5 MG: 10 INJECTION INTRAVENOUS at 19:00

## 2019-05-21 RX ADMIN — METRONIDAZOLE 500 MG: 500 INJECTION, SOLUTION INTRAVENOUS at 17:15

## 2019-05-21 RX ADMIN — Medication 10 ML: at 17:09

## 2019-05-21 RX ADMIN — OXYCODONE HYDROCHLORIDE AND ACETAMINOPHEN 1.5 TABLET: 5; 325 TABLET ORAL at 15:07

## 2019-05-21 RX ADMIN — AMLODIPINE BESYLATE 5 MG: 5 TABLET ORAL at 09:51

## 2019-05-21 RX ADMIN — METRONIDAZOLE 500 MG: 500 INJECTION, SOLUTION INTRAVENOUS at 10:49

## 2019-05-21 RX ADMIN — ONDANSETRON 4 MG: 2 INJECTION INTRAMUSCULAR; INTRAVENOUS at 07:54

## 2019-05-21 RX ADMIN — CARVEDILOL 25 MG: 25 TABLET, FILM COATED ORAL at 09:50

## 2019-05-21 RX ADMIN — IPRATROPIUM BROMIDE AND ALBUTEROL SULFATE 1 AMPULE: .5; 3 SOLUTION RESPIRATORY (INHALATION) at 10:10

## 2019-05-21 RX ADMIN — OXYCODONE HYDROCHLORIDE AND ACETAMINOPHEN 1.5 TABLET: 5; 325 TABLET ORAL at 08:00

## 2019-05-21 RX ADMIN — METRONIDAZOLE 500 MG: 500 INJECTION, SOLUTION INTRAVENOUS at 22:33

## 2019-05-21 RX ADMIN — LEVOFLOXACIN 500 MG: 5 INJECTION, SOLUTION INTRAVENOUS at 09:49

## 2019-05-21 RX ADMIN — METRONIDAZOLE 500 MG: 500 INJECTION, SOLUTION INTRAVENOUS at 04:21

## 2019-05-21 RX ADMIN — ISOSORBIDE MONONITRATE 30 MG: 30 TABLET, EXTENDED RELEASE ORAL at 09:50

## 2019-05-21 RX ADMIN — IPRATROPIUM BROMIDE AND ALBUTEROL SULFATE 1 AMPULE: .5; 3 SOLUTION RESPIRATORY (INHALATION) at 06:01

## 2019-05-21 ASSESSMENT — PAIN SCALES - GENERAL
PAINLEVEL_OUTOF10: 7
PAINLEVEL_OUTOF10: 0
PAINLEVEL_OUTOF10: 6
PAINLEVEL_OUTOF10: 4
PAINLEVEL_OUTOF10: 4
PAINLEVEL_OUTOF10: 6

## 2019-05-21 ASSESSMENT — PAIN DESCRIPTION - PAIN TYPE
TYPE: CHRONIC PAIN
TYPE: ACUTE PAIN

## 2019-05-21 ASSESSMENT — ENCOUNTER SYMPTOMS
COUGH: 0
DIARRHEA: 0
SHORTNESS OF BREATH: 0
NAUSEA: 0
VOMITING: 0

## 2019-05-21 ASSESSMENT — PAIN DESCRIPTION - ORIENTATION: ORIENTATION: RIGHT

## 2019-05-21 ASSESSMENT — PAIN DESCRIPTION - LOCATION: LOCATION: HIP

## 2019-05-21 ASSESSMENT — PAIN DESCRIPTION - PROGRESSION: CLINICAL_PROGRESSION: GRADUALLY IMPROVING

## 2019-05-21 NOTE — PLAN OF CARE
Problem: Pain:  Goal: Pain level will decrease  Description  Pain level will decrease  5/21/2019 0137 by Joshua Swan RN  Outcome: Met This Shift  5/20/2019 2132 by Maryanne Flores RN  Outcome: Met This Shift  5/20/2019 1312 by Kassie Nunes RN  Outcome: Not Met This Shift  Note:   Pain goal a \"0\" after pain medicine,reports hip pain a \"7\"-on Percocet 1 1/2 tabs every 6 hours as needed,which pt. Says she is on at home,not helping-she did speak to Dr Abdiel Miller about this. Consult with Dr Vandana Faye for hip pain,xray was ordered.   Goal: Control of acute pain  Description  Control of acute pain  5/20/2019 2132 by Maryanne Flores RN  Outcome: Met This Shift  5/20/2019 1312 by Kassie Nunes RN  Outcome: Not Met This Shift     Problem: Safety:  Goal: Free from accidental physical injury  Description  Free from accidental physical injury  5/21/2019 0137 by Joshua Swan RN  Outcome: Met This Shift  5/20/2019 2132 by Maryanne Flores RN  Outcome: Met This Shift

## 2019-05-21 NOTE — PLAN OF CARE
Problem: Pain:  Goal: Pain level will decrease  Description  Pain level will decrease  5/20/2019 2132 by Gabby Yi RN  Outcome: Met This Shift     Problem: Pain:  Goal: Control of acute pain  Description  Control of acute pain  5/20/2019 2132 by Gabby Yi RN  Outcome: Met This Shift     Problem: Safety:  Goal: Free from accidental physical injury  Description  Free from accidental physical injury  Outcome: Met This Shift

## 2019-05-21 NOTE — CONSULTS
Department of Orthopedic Surgery  Attending Consult Note        CHIEF COMPLAINT:  Right hip pain    History Obtained From:  patient    HISTORY OF PRESENT ILLNESS:                The patient is a 60 y.o. female who presents with  For history of right hip pain into the back. She denies a trauma but states she has been having fair amount of pain when she goes to walk and it is very difficult for her to stand up and move around. The pain was insidious onset. She denies any radiating pain numbness or tingling in also denies any trauma add the.     Past Medical History:        Diagnosis Date    Arthritis     Bursitis     right arm    Chest pain 12/06/2016    lexiscan stress    COPD exacerbation ([address] 2/12/2015    H/O cardiovascular stress test 10/20/2018    Lexiscan    Hypertension     MVP (mitral valve prolapse)      Past Surgical History:        Procedure Laterality Date    ABDOMEN SURGERY      APPENDECTOMY      BACK SURGERY      disc fracture in lumbar spine removed, neck same    EYE SURGERY      KNEE SURGERY      cyst on knee    LUMBAR FUSION  3/28/13    plif  L4-L5    TONSILLECTOMY      UPPER GASTROINTESTINAL ENDOSCOPY N/A 8/18/2018    EGD BIOPSY performed by Farzad Snyder MD at [address] [facility] ENDOSCOPY     Current Medications:   Current Facility-Administered Medications: ondansetron (ZOFRAN) injection 4 mg, 4 mg, Intravenous, Q8H PRN  0.45 % sodium chloride infusion, , Intravenous, Continuous  pantoprazole (PROTONIX) 80 mg in sodium chloride 0.9 % 100 mL infusion, 8 mg/hr, Intravenous, Continuous  ipratropium-albuterol (DUONEB) nebulizer solution 1 ampule, 1 ampule, Inhalation, Q4H  amLODIPine (NORVASC) tablet 5 mg, 5 mg, Oral, Daily  carvedilol (COREG) tablet 25 mg, 25 mg, Oral, BID WC  isosorbide mononitrate (IMDUR) extended release tablet 30 mg, 30 mg, Oral, Daily  oxyCODONE-acetaminophen (PERCOCET) 5-325 MG per tablet 1.5 tablet, 1.5 tablet, Oral, Q6H PRN  levofloxacin (LEVAQUIN) 500 MG/100ML infusion 500 mg, 500 mg, Intravenous, Q24H  metronidazole (FLAGYL) 500 mg in NaCl 100 mL IVPB premix, 500 mg, Intravenous, Q6H  Allergies:  Aspirin; Prochlorperazine edisylate; Tegretol [carbamazepine]; and Compazine [prochlorperazine maleate]    Social History:    independently at home denies any drug use  Family History:       Problem Relation Age of Onset    Cancer Mother     Arthritis Mother     Asthma Mother     Depression Mother     Diabetes Mother     Heart Disease Mother     High Blood Pressure Mother     High Cholesterol Mother     Arthritis Father     Asthma Father     Cancer Father     Depression Father     Diabetes Father     Heart Disease Father     High Blood Pressure Father     High Cholesterol Father      REVIEW OF SYSTEMS:    CONSTITUTIONAL:  negative  RESPIRATORY:  negative  CARDIOVASCULAR:  negative  GASTROINTESTINAL:  negative  GENITOURINARY:  negative  ENDOCRINE:  negative  MUSCULOSKELETAL:   For history and physical  NEUROLOGICAL:  negative  BEHAVIOR/PSYCH:  negative    PHYSICAL EXAM:    VITALS:  BP (!) 141/74   Pulse 66   Temp 98 °F (36.7 °C) (Oral)   Resp 18   Ht 5' 3\" (1.6 m)   Wt 174 lb 5 oz (79.1 kg)   LMP 03/01/2006   SpO2 96%   BMI 30.88 kg/m²    there is pain in the buttock and over the outside part of her hip and a little bit into the groin but there is no pain upon log roll either with the leg straight or flexed. She does have pain upon actively moving her hip but can move and there is some weakness to abduction but good strength of forward flexion. She has no lymphadenopathy recently has no pedal edema and no point tenderness around her knee or ankle. DATA:    Radiology Review:    X-rays of her hip are normal.  MRI does show some edema the gluteus minimus    IMPRESSION/RECOMMENDATIONS:     right hip muscle strain    Plan:   The pain in her hip does appears to be soft tissue would not articular in nature.   So would recommend a course of therapy and as she starts to

## 2019-05-21 NOTE — PLAN OF CARE
Problem: Pain:  Goal: Pain level will decrease  Description  Pain level will decrease  5/21/2019 1824 by Melinda Arteaga RN  Outcome: Met This Shift     Problem: Pain:  Goal: Control of acute pain  Description  Control of acute pain  5/21/2019 1824 by Melinda Arteaga RN  Outcome: Met This Shift     Problem: Pain:  Goal: Control of chronic pain  Description  Control of chronic pain  5/21/2019 1824 by Melinda Arteaga RN  Outcome: Met This Shift     Problem: Pain:  Goal: Patient's pain/discomfort is manageable  Description  Patient's pain/discomfort is manageable  5/21/2019 1824 by Melinda Arteaga RN  Outcome: Met This Shift

## 2019-05-21 NOTE — H&P
[address]                              HISTORY AND PHYSICAL    PATIENT NAME: Harman Nair              :        1960  MED REC NO:   [id number]                            ROOM:       6981  ACCOUNT NO:   [de-identified]                           ADMIT DATE: 2019  PROVIDER:     Segun Storey MD    HISTORY OF PRESENT ILLNESS:  This patient is a 71-year-old black female,  with a past medical history of angina, arthritis of the lumbar spine,  herniated lumbar disc, and mitral valve prolapse, who presents to the  hospital secondary to fatigue and weakness. Associated symptoms  included lower abdominal pain. The patient denied any fever or chills. The patient also denied any chest pain. The patient presented to  emergency room with above mentioned complaints. ER workup revealed the following - urinalysis is essentially negative. CBC was within normal limits, liver function tests were within normal  limits. The patient's BUN was 23 and creatinine was elevated at 1.8,  electrolytes were within normal limits. PAST MEDICAL HISTORY:  Positive for angina, positive for anxiety,  positive for arthritis of the knees and hips, positive for COPD,  positive for arthritis of the lumbar spine, positive for herniated  lumbar disk, positive for hypertension, positive for mass in the right  lobe of the liver, positive for mitral valve prolapse. Negative for  cancer and negative for strokes and negative for congestive heart  failure. ALLERGIES:  Include COMPAZINE, PROCHLORPERAZINE, and ASPIRIN. SMOKING HISTORY:  Half pack a day for 11 years. EtOH - social.  Illegal  drugs - the patient denies. FAMILY HISTORY:  Positive for hypertension, positive for coronary artery  disease.     HOME MEDICATIONS:  Include the following - Imdur 30 mg daily, Coreg 25  mg twice a day, Norvasc 5 mg daily, Hyzaar 100/25 one daily, Carafate 1  gm twice a day, acetaminophen 500 mg every six hours as needed, Protonix  40 mg daily. REVIEW OF SYSTEMS:  Essentially negative except for the present illness. PHYSICAL EXAMINATION:  GENERAL:  Well-developed, well-nourished black female, active and  cooperative, oriented x3, in acute distress secondary to lower abdominal  pain, fatigue, weakness, and severe significant back pain. HEENT:  Normocephalic and atraumatic. Pupils are equal, round, reactive  to light and accommodation. Extraocular muscles are intact. ENT clear. NECK:  Supple - no bruits. COR:  Regular rate and rhythm. LUNGS:  Clear. ABDOMEN:  Soft. Positive right upper quadrant pain. Positive left  lower quadrant pain. Positive rebound, left lower quadrant. Bowel  sounds are positive. EXTREMITIES:  No clubbing, no edema, no cyanosis. Dorsalis 1+. There  is about 2+ edema in lower extremities. NEUROLOGIC:  Cranial nerves II through XII intact distally, grossly  intact. No focal neurological deficits. ASSESSMENT:  1. Acute kidney injury. 2.  Diverticulitis. 3.  Anxiety. 4.  Arthritis of the lumbar spine. 5.  COPD. 6.  Gastritis. 7.  Herniated lumbar disk. 8.  Hypertension. 9.  Mass, right lobe of the liver. 10.  Mitral valve prolapse. PLAN:  Admit, IV fluids will be administered. The patient has  underlying acute kidney injury, the patient's BUN and creatinine will be  monitored very closely. Additionally, the patient will have x-rays of  her right hip and MRI also. It should be noted that the patient have a  CAT scan of the abdomen and pelvis in the emergency room, which was  consistent with colitis. Levaquin IV will be continued, additional IV  fluids.         Nury Okeefe MD    D: 05/21/2019 9:46:53       T: 05/21/2019 12:54:54     [email or web address]  Job#: [id number]     Doc#: [id number]    CC:

## 2019-05-21 NOTE — PLAN OF CARE
Problem: Pain:  Goal: Pain level will decrease  Description  Pain level will decrease  5/21/2019 1232 by Dena Bishop RN  Outcome: Met This Shift  5/21/2019 0137 by Alirio Poe RN  Outcome: Met This Shift  Goal: Control of acute pain  Description  Control of acute pain  Outcome: Met This Shift  Note:   C/o right hip pain,pain goal is a \"4\" after pain meds. Had an MRI,Dr Vimal Luong was consulted. Patient reports the PRN Percocet is effective.   Goal: Control of chronic pain  Description  Control of chronic pain  Outcome: Met This Shift  Goal: Patient's pain/discomfort is manageable  Description  Patient's pain/discomfort is manageable  Outcome: Met This Shift     Problem: Daily Care:  Goal: Daily care needs are met  Description  Daily care needs are met  Outcome: Met This Shift

## 2019-05-21 NOTE — PROGRESS NOTES
PROGRESS NOTE  By Esme Spangler M.D. [facility]  Dr. Epi Resendiz M.D.,  Dr. Giuliana King M.D.,   Dr. Kavin Garcia, D.O.,  Dr. Ina Cao M.D.,  Dr Luly Cobos D.O. Leandra Block  60 y.o.  female    SUBJECTIVE:  Improved abdominal pain. Tolerates that    OBJECTIVE:    BP (!) 141/74   Pulse 66   Temp 98 °F (36.7 °C) (Oral)   Resp 18   Ht 5' 3\" (1.6 m)   Wt 174 lb 5 oz (79.1 kg)   LMP 03/01/2006   SpO2 96%   BMI 30.88 kg/m²     General: NAD/-American female  HEENT: Anicteric sclerae/moist oromucosa  Neck: Supple/trachea midline  Chest: CTA B/symmetrical excursions  Cor: Regular  Abd.: Soft and obese. Nondistended  Extr.:no c/c/e  Skin:WD        DATA:    Monitor data reviewed - noted. Stool (measured) : 0 mL  Lab Results   Component Value Date    WBC 5.7 05/20/2019    RBC 4.10 05/20/2019    HGB 11.5 05/20/2019    HCT 36.9 05/20/2019    MCV 90.0 05/20/2019    MCH 28.0 05/20/2019    MCHC 31.2 05/20/2019    RDW 13.9 05/20/2019     05/20/2019    MPV 10.0 05/20/2019     Lab Results   Component Value Date     05/20/2019    K 3.6 05/20/2019     05/20/2019    CO2 24 05/20/2019    BUN 22 05/20/2019    CREATININE 1.6 05/20/2019    CALCIUM 8.8 05/20/2019    PROT 6.3 05/20/2019    LABALBU 3.5 05/20/2019    LABALBU 3.8 08/30/2011    BILITOT <0.2 05/20/2019    ALKPHOS 84 05/20/2019    AST 23 05/20/2019    ALT 13 05/20/2019     Lab Results   Component Value Date    LIPASE 18 05/20/2019     Lab Results   Component Value Date    AMYLASE 37 08/18/2018         ASSESSMENT/PLAN:  1. Abdominal pain  -Unremarkable EGD August 2018  -May consider de-escalating PPI dosing  -Consider repeat upper endoscopy 5/22  -Colonoscopy likely as outpatient - obtain records from previous procedures        2. Liver mass  -Stable appearance at current time  - Consider biopsy - per radiologist possible hemangioma - plan for 3-phase CT scan     3. Hip pain  -Await Ortho input    4. Renal failure  -KRAIG/CKD  -Repeat BMP tomorrow and if improved considered above described CT scan        NOTE:  This report was transcribed using voice recognition software. Every effort was made to ensure accuracy; however, inadvertent computerized transcription errors may be present.     Cristopher Frankel, MD  5/21/2019  12:49 PM

## 2019-05-21 NOTE — PROGRESS NOTES
Shantelle Morton is a 60 y.o. female patient. Current Facility-Administered Medications   Medication Dose Route Frequency Provider Last Rate Last Dose    ondansetron (ZOFRAN) injection 4 mg  4 mg Intravenous Q8H PRN Mati Mcgraw MD   4 mg at 05/21/19 0754    0.45 % sodium chloride infusion   Intravenous Continuous Mati Mcgraw  mL/hr at 05/20/19 1638      pantoprazole (PROTONIX) 80 mg in sodium chloride 0.9 % 100 mL infusion  8 mg/hr Intravenous Continuous Mati Mcgraw MD 10 mL/hr at 05/20/19 1449 8 mg/hr at 05/20/19 1449    ipratropium-albuterol (DUONEB) nebulizer solution 1 ampule  1 ampule Inhalation Q4H Mati Mcgraw MD   1 ampule at 05/21/19 0601    amLODIPine (NORVASC) tablet 5 mg  5 mg Oral Daily Mati Mcgraw MD   5 mg at 05/21/19 7388    carvedilol (COREG) tablet 25 mg  25 mg Oral BID WC Mati Mcgraw MD   25 mg at 05/21/19 2807    isosorbide mononitrate (IMDUR) extended release tablet 30 mg  30 mg Oral Daily Mati Mcgraw MD   30 mg at 05/21/19 0950    oxyCODONE-acetaminophen (PERCOCET) 5-325 MG per tablet 1.5 tablet  1.5 tablet Oral Q6H PRN Mati Mcgraw MD   1.5 tablet at 05/21/19 0800    levofloxacin (LEVAQUIN) 500 MG/100ML infusion 500 mg  500 mg Intravenous Q24H Mati Mcgraw  mL/hr at 05/21/19 0949 500 mg at 05/21/19 0949    metronidazole (FLAGYL) 500 mg in NaCl 100 mL IVPB premix  500 mg Intravenous Q6H Mati Mcgraw MD   Stopped at 05/21/19 6471     Allergies   Allergen Reactions    Aspirin Swelling    Prochlorperazine Edisylate Swelling    Tegretol [Carbamazepine] Swelling    Compazine [Prochlorperazine Maleate]      Active Problems:    KRAIG (acute kidney injury) ([facility] [address]  Resolved Problems:    * No resolved hospital problems. *    Blood pressure (!) 141/74, pulse 66, temperature 98 °F (36.7 °C), temperature source Oral, resp.  rate 18, height 5' 3\" (1.6 m), weight 174 lb 5 oz (79.1 kg), last menstrual period 03/01/2006, SpO2 96 %, not currently breastfeeding. Subjective:  Symptoms:  Stable. She reports malaise, weakness and anxiety. No shortness of breath, cough, chest pain, headache, chest pressure, anorexia or diarrhea. Diet:  Adequate intake. No nausea or vomiting. Activity level: Impaired due to pain. Pain:  She complains of pain that is severe. Pain is requiring pain medication. Objective:  General Appearance:  Uncomfortable. Vital signs: (most recent): Blood pressure (!) 141/74, pulse 66, temperature 98 °F (36.7 °C), temperature source Oral, resp. rate 18, height 5' 3\" (1.6 m), weight 174 lb 5 oz (79.1 kg), last menstrual period 03/01/2006, SpO2 96 %, not currently breastfeeding. Vital signs are normal.  No fever. Output: Producing urine. HEENT: Normal HEENT exam.    Lungs:  Normal effort and normal respiratory rate. Breath sounds clear to auscultation. No decreased breath sounds or wheezes. Heart: Normal rate. Abdomen: Abdomen is soft and distended. Bowel sounds are normal.   There is no abdominal tenderness. There is no epigastric area or suprapubic area tenderness. There is no rebound tenderness. There is no mass. Assessment:  (Active Problems:    KRAIG (acute kidney injury) ([address]  Principal Problem:    KRAIG (acute kidney injury) ([address]  Active Problems:    Herniated lumbar intervertebral disc    Degenerative arthritis of lumbar spine    Mass of right lobe of liver    Hypertension    Arthritis    COPD exacerbation (HCC)    Abdominal pain    Gastritis    Abdominal pain, epigastric  Resolved Problems:    * No resolved hospital problems. *   ). Plan:   (Iv fluide for egd  Mri hip  strain).        German Candelaria MD  5/21/2019

## 2019-05-22 VITALS
BODY MASS INDEX: 30.89 KG/M2 | DIASTOLIC BLOOD PRESSURE: 58 MMHG | WEIGHT: 174.31 LBS | SYSTOLIC BLOOD PRESSURE: 120 MMHG | TEMPERATURE: 97.8 F | HEIGHT: 63 IN | HEART RATE: 56 BPM | OXYGEN SATURATION: 97 % | RESPIRATION RATE: 18 BRPM

## 2019-05-22 VITALS
SYSTOLIC BLOOD PRESSURE: 120 MMHG | OXYGEN SATURATION: 100 % | RESPIRATION RATE: 20 BRPM | DIASTOLIC BLOOD PRESSURE: 62 MMHG

## 2019-05-22 LAB
AFP-TUMOR MARKER: 3 NG/ML (ref 0–9)
ANION GAP SERPL CALCULATED.3IONS-SCNC: 8 MMOL/L (ref 7–16)
BUN BLDV-MCNC: 10 MG/DL (ref 8–23)
CA 19-9: 2 U/ML (ref 0–37)
CALCIUM SERPL-MCNC: 8.9 MG/DL (ref 8.6–10.2)
CHLORIDE BLD-SCNC: 106 MMOL/L (ref 98–107)
CO2: 26 MMOL/L (ref 22–29)
CREAT SERPL-MCNC: 1.1 MG/DL (ref 0.5–1)
GFR AFRICAN AMERICAN: >60
GFR NON-AFRICAN AMERICAN: >60 ML/MIN/1.73
GLUCOSE BLD-MCNC: 91 MG/DL (ref 74–99)
POTASSIUM SERPL-SCNC: 4.2 MMOL/L (ref 3.5–5)
SODIUM BLD-SCNC: 140 MMOL/L (ref 132–146)

## 2019-05-22 PROCEDURE — 2580000003 HC RX 258: Performed by: NURSE ANESTHETIST, CERTIFIED REGISTERED

## 2019-05-22 PROCEDURE — 3700000000 HC ANESTHESIA ATTENDED CARE: Performed by: INTERNAL MEDICINE

## 2019-05-22 PROCEDURE — 6360000002 HC RX W HCPCS: Performed by: NURSE ANESTHETIST, CERTIFIED REGISTERED

## 2019-05-22 PROCEDURE — 7100000011 HC PHASE II RECOVERY - ADDTL 15 MIN: Performed by: INTERNAL MEDICINE

## 2019-05-22 PROCEDURE — 2500000003 HC RX 250 WO HCPCS: Performed by: FAMILY MEDICINE

## 2019-05-22 PROCEDURE — 96366 THER/PROPH/DIAG IV INF ADDON: CPT

## 2019-05-22 PROCEDURE — 88342 IMHCHEM/IMCYTCHM 1ST ANTB: CPT

## 2019-05-22 PROCEDURE — 3609012400 HC EGD TRANSORAL BIOPSY SINGLE/MULTIPLE: Performed by: INTERNAL MEDICINE

## 2019-05-22 PROCEDURE — C9113 INJ PANTOPRAZOLE SODIUM, VIA: HCPCS | Performed by: FAMILY MEDICINE

## 2019-05-22 PROCEDURE — 7100000010 HC PHASE II RECOVERY - FIRST 15 MIN: Performed by: INTERNAL MEDICINE

## 2019-05-22 PROCEDURE — 2709999900 HC NON-CHARGEABLE SUPPLY: Performed by: INTERNAL MEDICINE

## 2019-05-22 PROCEDURE — 6360000002 HC RX W HCPCS: Performed by: FAMILY MEDICINE

## 2019-05-22 PROCEDURE — 88305 TISSUE EXAM BY PATHOLOGIST: CPT

## 2019-05-22 PROCEDURE — 36415 COLL VENOUS BLD VENIPUNCTURE: CPT

## 2019-05-22 PROCEDURE — 2580000003 HC RX 258: Performed by: FAMILY MEDICINE

## 2019-05-22 PROCEDURE — G0378 HOSPITAL OBSERVATION PER HR: HCPCS

## 2019-05-22 PROCEDURE — 80048 BASIC METABOLIC PNL TOTAL CA: CPT

## 2019-05-22 PROCEDURE — 6370000000 HC RX 637 (ALT 250 FOR IP): Performed by: FAMILY MEDICINE

## 2019-05-22 RX ORDER — SODIUM CHLORIDE 9 MG/ML
INJECTION, SOLUTION INTRAVENOUS CONTINUOUS PRN
Status: DISCONTINUED | OUTPATIENT
Start: 2019-05-22 | End: 2019-05-22 | Stop reason: SDUPTHER

## 2019-05-22 RX ORDER — PROPOFOL 10 MG/ML
INJECTION, EMULSION INTRAVENOUS PRN
Status: DISCONTINUED | OUTPATIENT
Start: 2019-05-22 | End: 2019-05-22 | Stop reason: SDUPTHER

## 2019-05-22 RX ADMIN — SODIUM CHLORIDE 8 MG/HR: 9 INJECTION, SOLUTION INTRAVENOUS at 07:30

## 2019-05-22 RX ADMIN — CARVEDILOL 25 MG: 25 TABLET, FILM COATED ORAL at 16:24

## 2019-05-22 RX ADMIN — PROPOFOL 200 MG: 10 INJECTION, EMULSION INTRAVENOUS at 15:26

## 2019-05-22 RX ADMIN — SODIUM CHLORIDE: 4.5 INJECTION, SOLUTION INTRAVENOUS at 16:20

## 2019-05-22 RX ADMIN — MORPHINE SULFATE 5 MG: 10 INJECTION INTRAVENOUS at 04:10

## 2019-05-22 RX ADMIN — SODIUM CHLORIDE: 9 INJECTION, SOLUTION INTRAVENOUS at 15:15

## 2019-05-22 RX ADMIN — METRONIDAZOLE 500 MG: 500 INJECTION, SOLUTION INTRAVENOUS at 04:15

## 2019-05-22 RX ADMIN — OXYCODONE HYDROCHLORIDE AND ACETAMINOPHEN 1.5 TABLET: 5; 325 TABLET ORAL at 16:29

## 2019-05-22 RX ADMIN — MORPHINE SULFATE 5 MG: 10 INJECTION INTRAVENOUS at 12:25

## 2019-05-22 RX ADMIN — LEVOFLOXACIN 500 MG: 5 INJECTION, SOLUTION INTRAVENOUS at 10:10

## 2019-05-22 RX ADMIN — METRONIDAZOLE 500 MG: 500 INJECTION, SOLUTION INTRAVENOUS at 16:19

## 2019-05-22 RX ADMIN — METRONIDAZOLE 500 MG: 500 INJECTION, SOLUTION INTRAVENOUS at 11:52

## 2019-05-22 ASSESSMENT — PAIN DESCRIPTION - PAIN TYPE
TYPE: CHRONIC PAIN
TYPE: CHRONIC PAIN
TYPE: SURGICAL PAIN
TYPE: SURGICAL PAIN

## 2019-05-22 ASSESSMENT — PAIN SCALES - GENERAL
PAINLEVEL_OUTOF10: 6
PAINLEVEL_OUTOF10: 5
PAINLEVEL_OUTOF10: 0
PAINLEVEL_OUTOF10: 0
PAINLEVEL_OUTOF10: 6
PAINLEVEL_OUTOF10: 4

## 2019-05-22 ASSESSMENT — PAIN DESCRIPTION - ORIENTATION
ORIENTATION: RIGHT
ORIENTATION: RIGHT

## 2019-05-22 ASSESSMENT — ENCOUNTER SYMPTOMS
DIARRHEA: 0
VOMITING: 0
COUGH: 1
SHORTNESS OF BREATH: 0
NAUSEA: 0

## 2019-05-22 ASSESSMENT — PAIN DESCRIPTION - DESCRIPTORS
DESCRIPTORS: ACHING
DESCRIPTORS: ACHING;DISCOMFORT;SORE

## 2019-05-22 ASSESSMENT — PAIN - FUNCTIONAL ASSESSMENT: PAIN_FUNCTIONAL_ASSESSMENT: ACTIVITIES ARE NOT PREVENTED

## 2019-05-22 ASSESSMENT — PAIN DESCRIPTION - PROGRESSION: CLINICAL_PROGRESSION: GRADUALLY IMPROVING

## 2019-05-22 ASSESSMENT — PAIN DESCRIPTION - LOCATION
LOCATION: HIP
LOCATION: HIP;KNEE

## 2019-05-22 NOTE — PLAN OF CARE
Problem: Pain:  Goal: Pain level will decrease  Description  Pain level will decrease  5/22/2019 1705 by Zander Hunter RN  Outcome: Met This Shift     Problem: Pain:  Goal: Control of acute pain  Description  Control of acute pain  5/22/2019 1705 by Zander Hunter RN  Outcome: Met This Shift

## 2019-05-22 NOTE — PROGRESS NOTES
Spoke with yolie Aguayo for discharge and to continue all previous home medications and will see in his office tomorrow 5/23/19.

## 2019-05-22 NOTE — PROGRESS NOTES
Yogesh Keen is a 60 y.o. female patient.     Current Facility-Administered Medications   Medication Dose Route Frequency Provider Last Rate Last Dose    morphine injection 5 mg  5 mg Intravenous Q6H PRN Nai Paez MD   5 mg at 05/22/19 0410    ondansetron (ZOFRAN) injection 4 mg  4 mg Intravenous Q8H PRN Nai Paez MD   4 mg at 05/21/19 0754    0.45 % sodium chloride infusion   Intravenous Continuous Nai Paez MD 75 mL/hr at 05/22/19 1006      pantoprazole (PROTONIX) 80 mg in sodium chloride 0.9 % 100 mL infusion  8 mg/hr Intravenous Continuous Nai Paez MD 10 mL/hr at 05/22/19 0730 8 mg/hr at 05/22/19 0730    ipratropium-albuterol (DUONEB) nebulizer solution 1 ampule  1 ampule Inhalation Q4H Nai Paez MD   1 ampule at 05/21/19 1010    amLODIPine (NORVASC) tablet 5 mg  5 mg Oral Daily Nai Paez MD   Stopped at 05/22/19 5841    carvedilol (COREG) tablet 25 mg  25 mg Oral BID WC Nai Paez MD   Stopped at 05/22/19 0825    isosorbide mononitrate (IMDUR) extended release tablet 30 mg  30 mg Oral Daily Nai Paez MD   Stopped at 05/22/19 0825    oxyCODONE-acetaminophen (PERCOCET) 5-325 MG per tablet 1.5 tablet  1.5 tablet Oral Q6H PRN Nai Paez MD   1.5 tablet at 05/21/19 1507    levofloxacin (LEVAQUIN) 500 MG/100ML infusion 500 mg  500 mg Intravenous Q24H Nai Paez  mL/hr at 05/22/19 1010 500 mg at 05/22/19 1010    metronidazole (FLAGYL) 500 mg in NaCl 100 mL IVPB premix  500 mg Intravenous Q6H Nai Paez MD   Stopped at 05/22/19 0515     Allergies   Allergen Reactions    Aspirin Swelling    Prochlorperazine Edisylate Swelling    Tegretol [Carbamazepine] Swelling    Compazine [Prochlorperazine Maleate]      Principal Problem:    KRAIG (acute kidney injury) ([address]  Active Problems:    Herniated lumbar intervertebral disc    Degenerative arthritis of lumbar spine    Mass of right lobe of liver    Hypertension    Arthritis    COPD exacerbation ([facility] [address]    Abdominal pain    Gastritis    Abdominal pain, epigastric  Resolved Problems:    * No resolved hospital problems. *    Blood pressure 113/64, pulse 59, temperature 97.8 °F (36.6 °C), temperature source Oral, resp. rate 16, height 5' 3\" (1.6 m), weight 174 lb 5 oz (79.1 kg), last menstrual period 03/01/2006, SpO2 94 %, not currently breastfeeding. Subjective:  Symptoms:  Stable. She reports malaise, cough, weakness and anxiety. No shortness of breath, chest pain, headache, chest pressure, anorexia or diarrhea. Diet:  Adequate intake. No nausea or vomiting. Activity level: Impaired due to weakness. Pain:  She complains of pain that is severe. Pain is requiring pain medication. Objective:  General Appearance:  Comfortable. Vital signs: (most recent): Blood pressure 113/64, pulse 59, temperature 97.8 °F (36.6 °C), temperature source Oral, resp. rate 16, height 5' 3\" (1.6 m), weight 174 lb 5 oz (79.1 kg), last menstrual period 03/01/2006, SpO2 94 %, not currently breastfeeding. Vital signs are normal.  No fever. Output: Producing urine. HEENT: Normal HEENT exam.    Lungs:  Normal effort and normal respiratory rate. There are decreased breath sounds, wheezes and rhonchi. Heart: Normal rate. Regular rhythm. No murmur. Abdomen: Abdomen is soft and non-distended. Bowel sounds are normal.   There is no epigastric area or suprapubic area tenderness. There is no rebound tenderness. There is no mass. Extremities: There is no deformity, effusion, dependent edema or local swelling. Pulses: Distal pulses are intact. Neurological: Patient is alert.       Assessment:  (Principal Problem:    KRAIG (acute kidney injury) ([facility] [address]  Active Problems:    Herniated lumbar intervertebral disc    Degenerative arthritis of lumbar spine    Mass of right lobe of liver    Hypertension    Arthritis    COPD exacerbation (HCC)    Abdominal pain    Gastritis    Abdominal pain, epigastric  Resolved Problems:    * No resolved hospital problems. *   ). Plan:   (Iv fluids  Iv protonix  For egd).        Macie Redding MD  5/22/2019

## 2019-05-22 NOTE — PLAN OF CARE
Problem: Pain:  Goal: Pain level will decrease  Description  Pain level will decrease  5/21/2019 2217 by Lilli Brown RN  Outcome: Met This Shift  5/21/2019 1824 by Leta Jensen RN  Outcome: Met This Shift  5/21/2019 1232 by Floridalma Oconnell RN  Outcome: Met This Shift  Goal: Control of acute pain  Description  Control of acute pain  5/21/2019 1824 by Leta Jensen RN  Outcome: Met This Shift  5/21/2019 1232 by Floridalma Oconnell RN  Outcome: Met This Shift  Note:   C/o right hip pain,pain goal is a \"4\" after pain meds. Had an MRI,Dr Julia Lamb was consulted. Patient reports the PRN Percocet is effective.   Goal: Control of chronic pain  Description  Control of chronic pain  5/21/2019 1824 by Leta Jensen RN  Outcome: Met This Shift  5/21/2019 1232 by Floridalma Oconnell RN  Outcome: Met This Shift  Goal: Patient's pain/discomfort is manageable  Description  Patient's pain/discomfort is manageable  5/21/2019 1824 by Leta Jensen RN  Outcome: Met This Shift  5/21/2019 1232 by Floridalma Oconnell RN  Outcome: Met This Shift     Problem: Daily Care:  Goal: Daily care needs are met  Description  Daily care needs are met  5/21/2019 2217 by Lilli Brown RN  Outcome: Met This Shift  5/21/2019 1824 by Leta Jensen RN  Outcome: Met This Shift  5/21/2019 1232 by Floridalma Oconnell RN  Outcome: Met This Shift

## 2019-05-25 LAB — CULTURE, BLOOD 2: NORMAL

## 2019-10-18 ENCOUNTER — HOSPITAL ENCOUNTER (OUTPATIENT)
Dept: ULTRASOUND IMAGING | Age: 59
Discharge: HOME | End: 2019-10-18
Payer: COMMERCIAL

## 2019-10-18 ENCOUNTER — HOSPITAL ENCOUNTER (OUTPATIENT)
Age: 59
Discharge: HOME | End: 2019-10-18
Payer: COMMERCIAL

## 2019-10-18 DIAGNOSIS — R52 PAIN: ICD-10-CM

## 2019-10-18 PROCEDURE — 93971 EXTREMITY STUDY: CPT

## 2020-08-05 ENCOUNTER — HOSPITAL ENCOUNTER (OUTPATIENT)
Age: 60
Discharge: HOME | End: 2020-08-07
Payer: COMMERCIAL

## 2020-08-05 ENCOUNTER — HOSPITAL ENCOUNTER (OUTPATIENT)
Dept: GENERAL RADIOLOGY | Age: 60
Discharge: HOME | End: 2020-08-07
Payer: COMMERCIAL

## 2020-08-05 PROCEDURE — 73110 X-RAY EXAM OF WRIST: CPT

## 2020-09-17 ENCOUNTER — HOSPITAL ENCOUNTER (OUTPATIENT)
Age: 60
Discharge: HOME | End: 2020-09-17
Payer: COMMERCIAL

## 2020-09-17 LAB
EKG ATRIAL RATE: 72 BPM
EKG P AXIS: 80 DEGREES
EKG P-R INTERVAL: 260 MS
EKG Q-T INTERVAL: 420 MS
EKG QRS DURATION: 76 MS
EKG QTC CALCULATION (BAZETT): 459 MS
EKG R AXIS: -9 DEGREES
EKG T AXIS: 17 DEGREES
EKG VENTRICULAR RATE: 72 BPM

## 2020-09-17 PROCEDURE — 93005 ELECTROCARDIOGRAM TRACING: CPT | Performed by: FAMILY MEDICINE

## 2020-10-07 ENCOUNTER — APPOINTMENT (OUTPATIENT)
Dept: CT IMAGING | Age: 60
End: 2020-10-07
Attending: FAMILY MEDICINE
Payer: COMMERCIAL

## 2020-10-07 ENCOUNTER — APPOINTMENT (OUTPATIENT)
Dept: GENERAL RADIOLOGY | Age: 60
End: 2020-10-07
Attending: FAMILY MEDICINE
Payer: COMMERCIAL

## 2020-10-07 ENCOUNTER — HOSPITAL ENCOUNTER (OUTPATIENT)
Age: 60
Setting detail: OBSERVATION
Discharge: HOME | End: 2020-10-10
Attending: FAMILY MEDICINE | Admitting: FAMILY MEDICINE
Payer: COMMERCIAL

## 2020-10-07 PROBLEM — G45.9 TIA (TRANSIENT ISCHEMIC ATTACK): Status: ACTIVE | Noted: 2020-10-07

## 2020-10-07 LAB
ANION GAP SERPL CALCULATED.3IONS-SCNC: 15 MMOL/L (ref 7–16)
BASOPHILS ABSOLUTE: 0.03 E9/L (ref 0–0.2)
BASOPHILS RELATIVE PERCENT: 0.5 % (ref 0–2)
BUN BLDV-MCNC: 19 MG/DL (ref 8–23)
CALCIUM SERPL-MCNC: 9.4 MG/DL (ref 8.6–10.2)
CHLORIDE BLD-SCNC: 98 MMOL/L (ref 98–107)
CO2: 22 MMOL/L (ref 22–29)
CREAT SERPL-MCNC: 1.4 MG/DL (ref 0.5–1)
EOSINOPHILS ABSOLUTE: 0.09 E9/L (ref 0.05–0.5)
EOSINOPHILS RELATIVE PERCENT: 1.5 % (ref 0–6)
GFR AFRICAN AMERICAN: 46
GFR NON-AFRICAN AMERICAN: 46 ML/MIN/1.73
GLUCOSE BLD-MCNC: 160 MG/DL (ref 74–99)
HCT VFR BLD CALC: 37.8 % (ref 34–48)
HEMOGLOBIN: 12 G/DL (ref 11.5–15.5)
IMMATURE GRANULOCYTES #: 0.01 E9/L
IMMATURE GRANULOCYTES %: 0.2 % (ref 0–5)
LYMPHOCYTES ABSOLUTE: 3.19 E9/L (ref 1.5–4)
LYMPHOCYTES RELATIVE PERCENT: 52.9 % (ref 20–42)
MCH RBC QN AUTO: 28.7 PG (ref 26–35)
MCHC RBC AUTO-ENTMCNC: 31.7 % (ref 32–34.5)
MCV RBC AUTO: 90.4 FL (ref 80–99.9)
MONOCYTES ABSOLUTE: 0.38 E9/L (ref 0.1–0.95)
MONOCYTES RELATIVE PERCENT: 6.3 % (ref 2–12)
NEUTROPHILS ABSOLUTE: 2.33 E9/L (ref 1.8–7.3)
NEUTROPHILS RELATIVE PERCENT: 38.6 % (ref 43–80)
PDW BLD-RTO: 13.6 FL (ref 11.5–15)
PLATELET # BLD: 180 E9/L (ref 130–450)
PMV BLD AUTO: 10.1 FL (ref 7–12)
POTASSIUM SERPL-SCNC: 3.8 MMOL/L (ref 3.5–5)
RBC # BLD: 4.18 E12/L (ref 3.5–5.5)
SODIUM BLD-SCNC: 135 MMOL/L (ref 132–146)
WBC # BLD: 6 E9/L (ref 4.5–11.5)

## 2020-10-07 PROCEDURE — G0379 DIRECT REFER HOSPITAL OBSERV: HCPCS

## 2020-10-07 PROCEDURE — G0378 HOSPITAL OBSERVATION PER HR: HCPCS

## 2020-10-07 PROCEDURE — 80048 BASIC METABOLIC PNL TOTAL CA: CPT

## 2020-10-07 PROCEDURE — 73521 X-RAY EXAM HIPS BI 2 VIEWS: CPT

## 2020-10-07 PROCEDURE — 85025 COMPLETE CBC W/AUTO DIFF WBC: CPT

## 2020-10-07 PROCEDURE — 70450 CT HEAD/BRAIN W/O DYE: CPT

## 2020-10-07 PROCEDURE — 72100 X-RAY EXAM L-S SPINE 2/3 VWS: CPT

## 2020-10-07 PROCEDURE — 6370000000 HC RX 637 (ALT 250 FOR IP): Performed by: FAMILY MEDICINE

## 2020-10-07 PROCEDURE — 36415 COLL VENOUS BLD VENIPUNCTURE: CPT

## 2020-10-07 RX ORDER — CARVEDILOL 25 MG/1
25 TABLET ORAL 2 TIMES DAILY WITH MEALS
Status: DISCONTINUED | OUTPATIENT
Start: 2020-10-08 | End: 2020-10-10 | Stop reason: HOSPADM

## 2020-10-07 RX ORDER — LOSARTAN POTASSIUM AND HYDROCHLOROTHIAZIDE 25; 100 MG/1; MG/1
1 TABLET ORAL DAILY
Status: DISCONTINUED | OUTPATIENT
Start: 2020-10-08 | End: 2020-10-07

## 2020-10-07 RX ORDER — PANTOPRAZOLE SODIUM 40 MG/1
40 TABLET, DELAYED RELEASE ORAL
Status: DISCONTINUED | OUTPATIENT
Start: 2020-10-08 | End: 2020-10-10 | Stop reason: HOSPADM

## 2020-10-07 RX ORDER — PROMETHAZINE HYDROCHLORIDE 25 MG/1
25 TABLET ORAL EVERY 6 HOURS PRN
COMMUNITY
End: 2020-11-17

## 2020-10-07 RX ORDER — HYDROCHLOROTHIAZIDE 25 MG/1
25 TABLET ORAL DAILY
Status: DISCONTINUED | OUTPATIENT
Start: 2020-10-08 | End: 2020-10-10 | Stop reason: HOSPADM

## 2020-10-07 RX ORDER — ISOSORBIDE MONONITRATE 30 MG/1
30 TABLET, EXTENDED RELEASE ORAL DAILY
Status: DISCONTINUED | OUTPATIENT
Start: 2020-10-08 | End: 2020-10-10 | Stop reason: HOSPADM

## 2020-10-07 RX ORDER — SUCRALFATE 1 G/1
1 TABLET ORAL EVERY 12 HOURS PRN
Status: DISCONTINUED | OUTPATIENT
Start: 2020-10-07 | End: 2020-10-10 | Stop reason: HOSPADM

## 2020-10-07 RX ORDER — AMLODIPINE BESYLATE 5 MG/1
5 TABLET ORAL DAILY
Status: DISCONTINUED | OUTPATIENT
Start: 2020-10-08 | End: 2020-10-10 | Stop reason: HOSPADM

## 2020-10-07 RX ORDER — ACETAMINOPHEN 500 MG
500 TABLET ORAL EVERY 6 HOURS PRN
Status: DISCONTINUED | OUTPATIENT
Start: 2020-10-07 | End: 2020-10-10 | Stop reason: HOSPADM

## 2020-10-07 RX ORDER — LOSARTAN POTASSIUM 50 MG/1
100 TABLET ORAL DAILY
Status: DISCONTINUED | OUTPATIENT
Start: 2020-10-08 | End: 2020-10-10 | Stop reason: HOSPADM

## 2020-10-07 RX ORDER — OXYCODONE HYDROCHLORIDE AND ACETAMINOPHEN 5; 325 MG/1; MG/1
1.5 TABLET ORAL EVERY 4 HOURS PRN
Status: DISCONTINUED | OUTPATIENT
Start: 2020-10-07 | End: 2020-10-10 | Stop reason: HOSPADM

## 2020-10-07 RX ADMIN — OXYCODONE HYDROCHLORIDE AND ACETAMINOPHEN 1.5 TABLET: 5; 325 TABLET ORAL at 22:49

## 2020-10-07 ASSESSMENT — PAIN DESCRIPTION - DIRECTION
RADIATING_TOWARDS: FEET
RADIATING_TOWARDS: FEET

## 2020-10-07 ASSESSMENT — PAIN - FUNCTIONAL ASSESSMENT
PAIN_FUNCTIONAL_ASSESSMENT: PREVENTS OR INTERFERES WITH MANY ACTIVE NOT PASSIVE ACTIVITIES
PAIN_FUNCTIONAL_ASSESSMENT: PREVENTS OR INTERFERES WITH ALL ACTIVE AND SOME PASSIVE ACTIVITIES

## 2020-10-07 ASSESSMENT — PAIN SCALES - GENERAL
PAINLEVEL_OUTOF10: 10
PAINLEVEL_OUTOF10: 10

## 2020-10-07 ASSESSMENT — PAIN DESCRIPTION - DESCRIPTORS
DESCRIPTORS: ACHING;DISCOMFORT;NAGGING
DESCRIPTORS: ACHING;DISCOMFORT;NAGGING

## 2020-10-07 ASSESSMENT — PAIN DESCRIPTION - LOCATION
LOCATION: GROIN
LOCATION: GROIN

## 2020-10-07 ASSESSMENT — PAIN DESCRIPTION - PAIN TYPE
TYPE: ACUTE PAIN
TYPE: ACUTE PAIN

## 2020-10-08 ENCOUNTER — APPOINTMENT (OUTPATIENT)
Dept: MRI IMAGING | Age: 60
End: 2020-10-08
Attending: FAMILY MEDICINE
Payer: COMMERCIAL

## 2020-10-08 ENCOUNTER — APPOINTMENT (OUTPATIENT)
Dept: INTERVENTIONAL RADIOLOGY/VASCULAR | Age: 60
End: 2020-10-08
Attending: FAMILY MEDICINE
Payer: COMMERCIAL

## 2020-10-08 PROBLEM — M54.50 LUMBAGO: Status: ACTIVE | Noted: 2020-10-08

## 2020-10-08 LAB
LV EF: 56 %
LVEF MODALITY: NORMAL

## 2020-10-08 PROCEDURE — 70551 MRI BRAIN STEM W/O DYE: CPT

## 2020-10-08 PROCEDURE — G0378 HOSPITAL OBSERVATION PER HR: HCPCS

## 2020-10-08 PROCEDURE — 6370000000 HC RX 637 (ALT 250 FOR IP): Performed by: PSYCHIATRY & NEUROLOGY

## 2020-10-08 PROCEDURE — 93880 EXTRACRANIAL BILAT STUDY: CPT

## 2020-10-08 PROCEDURE — 6370000000 HC RX 637 (ALT 250 FOR IP): Performed by: FAMILY MEDICINE

## 2020-10-08 PROCEDURE — 72148 MRI LUMBAR SPINE W/O DYE: CPT

## 2020-10-08 PROCEDURE — 93306 TTE W/DOPPLER COMPLETE: CPT

## 2020-10-08 RX ORDER — QUETIAPINE FUMARATE 25 MG/1
25 TABLET, FILM COATED ORAL NIGHTLY
Status: DISCONTINUED | OUTPATIENT
Start: 2020-10-08 | End: 2020-10-08

## 2020-10-08 RX ORDER — CLOPIDOGREL BISULFATE 75 MG/1
75 TABLET ORAL DAILY
Status: DISCONTINUED | OUTPATIENT
Start: 2020-10-08 | End: 2020-10-10 | Stop reason: HOSPADM

## 2020-10-08 RX ORDER — QUETIAPINE FUMARATE 25 MG/1
25 TABLET, FILM COATED ORAL NIGHTLY
Status: DISCONTINUED | OUTPATIENT
Start: 2020-10-08 | End: 2020-10-10 | Stop reason: HOSPADM

## 2020-10-08 RX ORDER — QUETIAPINE FUMARATE 25 MG/1
25 TABLET, FILM COATED ORAL 2 TIMES DAILY
Status: DISCONTINUED | OUTPATIENT
Start: 2020-10-08 | End: 2020-10-08

## 2020-10-08 RX ORDER — QUETIAPINE FUMARATE 25 MG/1
25 TABLET, FILM COATED ORAL ONCE
Status: DISCONTINUED | OUTPATIENT
Start: 2020-10-08 | End: 2020-10-08 | Stop reason: CLARIF

## 2020-10-08 RX ORDER — ATORVASTATIN CALCIUM 20 MG/1
20 TABLET, FILM COATED ORAL NIGHTLY
Status: DISCONTINUED | OUTPATIENT
Start: 2020-10-08 | End: 2020-10-10 | Stop reason: HOSPADM

## 2020-10-08 RX ADMIN — QUETIAPINE FUMARATE 25 MG: 25 TABLET ORAL at 21:25

## 2020-10-08 RX ADMIN — ISOSORBIDE MONONITRATE 30 MG: 30 TABLET, EXTENDED RELEASE ORAL at 12:33

## 2020-10-08 RX ADMIN — ATORVASTATIN CALCIUM 20 MG: 20 TABLET, FILM COATED ORAL at 21:25

## 2020-10-08 RX ADMIN — CLOPIDOGREL BISULFATE 75 MG: 75 TABLET ORAL at 12:32

## 2020-10-08 RX ADMIN — LOSARTAN POTASSIUM 100 MG: 50 TABLET, FILM COATED ORAL at 12:32

## 2020-10-08 RX ADMIN — HYDROCHLOROTHIAZIDE 25 MG: 25 TABLET ORAL at 12:32

## 2020-10-08 RX ADMIN — AMLODIPINE BESYLATE 5 MG: 5 TABLET ORAL at 12:32

## 2020-10-08 RX ADMIN — OXYCODONE HYDROCHLORIDE AND ACETAMINOPHEN 1.5 TABLET: 5; 325 TABLET ORAL at 14:43

## 2020-10-08 RX ADMIN — OXYCODONE HYDROCHLORIDE AND ACETAMINOPHEN 1.5 TABLET: 5; 325 TABLET ORAL at 06:10

## 2020-10-08 RX ADMIN — CARVEDILOL 25 MG: 25 TABLET, FILM COATED ORAL at 12:32

## 2020-10-08 RX ADMIN — PANTOPRAZOLE SODIUM 40 MG: 40 TABLET, DELAYED RELEASE ORAL at 06:07

## 2020-10-08 RX ADMIN — QUETIAPINE FUMARATE 25 MG: 25 TABLET ORAL at 02:37

## 2020-10-08 ASSESSMENT — PAIN SCALES - GENERAL
PAINLEVEL_OUTOF10: 7
PAINLEVEL_OUTOF10: 4
PAINLEVEL_OUTOF10: 0

## 2020-10-08 ASSESSMENT — PAIN DESCRIPTION - DESCRIPTORS: DESCRIPTORS: ACHING;DISCOMFORT

## 2020-10-08 ASSESSMENT — PAIN - FUNCTIONAL ASSESSMENT: PAIN_FUNCTIONAL_ASSESSMENT: PREVENTS OR INTERFERES SOME ACTIVE ACTIVITIES AND ADLS

## 2020-10-08 ASSESSMENT — PAIN DESCRIPTION - ORIENTATION: ORIENTATION: RIGHT;LEFT

## 2020-10-08 ASSESSMENT — PAIN DESCRIPTION - ONSET: ONSET: GRADUAL

## 2020-10-08 ASSESSMENT — PAIN SCALES - WONG BAKER: WONGBAKER_NUMERICALRESPONSE: 0

## 2020-10-08 ASSESSMENT — PAIN DESCRIPTION - LOCATION: LOCATION: GROIN

## 2020-10-08 ASSESSMENT — PAIN DESCRIPTION - PAIN TYPE: TYPE: ACUTE PAIN

## 2020-10-08 ASSESSMENT — PAIN DESCRIPTION - FREQUENCY: FREQUENCY: INTERMITTENT

## 2020-10-08 ASSESSMENT — PAIN DESCRIPTION - PROGRESSION: CLINICAL_PROGRESSION: NOT CHANGED

## 2020-10-08 NOTE — H&P
[address]                              HISTORY AND PHYSICAL    PATIENT NAME: Allene Aschoff              :        1960  MED REC NO:   [id number]                            ROOM:       0329  ACCOUNT NO:   [de-identified]                           ADMIT DATE: 10/07/2020  PROVIDER:     Maurice Zaman MD    HISTORY OF PRESENT ILLNESS:  The patient is a 59-year-old white female,  with a history of hypertension, COPD, and mitral valve prolapse,  presents to the hospital secondary to dysfunctional gait. The patient  states that she has been dragging her left lower extremity and cannot  walk normally. The patient stated that the dragging of the left lower  extremity started on the day of admission. The patient also is  complaining of ataxia, numbness in the left leg, weakness in the left  upper extremity, and diffuse myalgia. The patient denied any blurred  vision or slurred speech. The patient states the symptoms came on  suddenly within the time of admission. PAST MEDICAL HISTORY:  Positive for TIA, positive for gastritis,  positive for anxiety, positive for COPD, positive for arthritis of  lumbar spine, positive for herniated lumbar disc, positive for  hypertension, positive for mass in right lobe of the liver, positive for  mitral valve prolapse. Negative for cancer, negative for strokes,  negative for hepatitis. ALLERGIES:  Include ASPIRIN, TEGRETOL, PROCHLORPERAZINE. SOCIAL HISTORY:  Smoking history - half-a-pack per day for last seven  years. EtOH - none. Illegal drugs - the patient denies.     HOME MEDICATIONS:  Include the following - Phenergan 25 mg every 6 hours  as needed, Percocet 7.5/325 one every 6 hours as needed, Imdur 30 mg  daily, Coreg 25 mg twice a day, Norvasc 5 mg daily, Hyzaar 100/25 one a  day, Tylenol every 8 hours a day, Carafate 1 gm every 12 hours, Protonix  40 mg one daily.    FAMILY HISTORY:  Positive for hypertension, positive for coronary artery  disease. REVIEW OF SYSTEMS:  Essentially negative except for the present illness. PHYSICAL EXAMINATION:  GENERAL:  Well-developed, well-nourished black female, active,  cooperative, oriented x3, complaining of ataxia, left foot drag, and  dysfunctional gait. HEENT:  Normocephalic and atraumatic. Pupils equal, round, reactive to  light and accommodation. Extraocular muscles are intact. ENT clear. NECK:  Supple - no bruits. COR:  Regular rate and rhythm. LUNGS:  Clear. ABDOMEN:  Soft. No pain, no tenderness. No rebound, no spasm. Bowel  sounds are positive. EXTREMITIES:  No clubbing, no edema, no cyanosis, dorsalis 1+. NEUROLOGIC:  The patient does have weakness in left lower extremity,  pain to palpation in left lower extremity, numbness in the left lower  extremity. ASSESSMENT:  1. Acute peripheral neuropathy. 2.  Ataxia - rule out CVA and multiple sclerosis. 3.  Arthritis of the lumbar spine. 4.  Anxiety. 5.  COPD. 6.  TIA. 7.  Arthritis of lumbar spine. 8.  Gastritis. 9.  Herniated lumbar disc. 10.  Hypertension. 11.  Mitral valve prolapse. PLAN:  Admit to observation status, we will obtain a CT scan of the  head, MRI of the head also. Carotid ultrasound will also be obtained  and the patient will be monitored very closely.         Braxton Liu MD    D: 10/08/2020 9:28:03       T: 10/08/2020 9:37:21     [email or web address]  Job#: [id number]     Doc#: [id number]    CC:

## 2020-10-08 NOTE — CONSULTS
GASTROINTESTINAL ENDOSCOPY N/A 8/18/2018    EGD BIOPSY performed by Nallely Martinez MD at [facility] N/A 5/22/2019    EGD BIOPSY performed by Dewayne Snider MD at [place] [address]         Outside reports reviewed: ER records, historical medical records, lab reports and radiology reports. Patient's medications, allergies, past medical, surgical, social and family histories were reviewed and updated as appropriate. Review of Systems  A comprehensive review of systems was negative except for:       Objective:     Neuro exam 152/78, pulse 76, she is afebrile  General: normal orientation and alertness. Cranial nerve testing was normal.  Funduscopic eye exam revealed not testable. Motor exam: 4/5. Deep tendon reflexes were absent bilaterally. Plantar responses were flexor bilaterally. Cerebellar exam noted finger to nose without dysmetria. Sensation was decreased in the lower extremities. Assessment:   Possible vertebrobasilar TIA  Lumbago with history of 2 low back surgeries most recently in 2012 per Dr. Sood Rajani:   MRI of brain pending. If carotid us shows significant stenosis then vascular surgery evaluation  Will order MRI of low back. Recommend evaluation by Dr. Governor Jackson who knows her from previous surgery. Statin plus low-dose aspirin and ongoing blood pressure control as per primary care. Discussed smoking cessation.   Thank you for consultation

## 2020-10-09 ENCOUNTER — APPOINTMENT (OUTPATIENT)
Dept: MRI IMAGING | Age: 60
End: 2020-10-09
Attending: FAMILY MEDICINE
Payer: COMMERCIAL

## 2020-10-09 PROCEDURE — G0378 HOSPITAL OBSERVATION PER HR: HCPCS

## 2020-10-09 PROCEDURE — 6360000002 HC RX W HCPCS: Performed by: FAMILY MEDICINE

## 2020-10-09 PROCEDURE — 6370000000 HC RX 637 (ALT 250 FOR IP): Performed by: FAMILY MEDICINE

## 2020-10-09 PROCEDURE — 6370000000 HC RX 637 (ALT 250 FOR IP): Performed by: PSYCHIATRY & NEUROLOGY

## 2020-10-09 PROCEDURE — 73721 MRI JNT OF LWR EXTRE W/O DYE: CPT

## 2020-10-09 PROCEDURE — 96376 TX/PRO/DX INJ SAME DRUG ADON: CPT

## 2020-10-09 PROCEDURE — 96374 THER/PROPH/DIAG INJ IV PUSH: CPT

## 2020-10-09 RX ORDER — MORPHINE SULFATE 5 MG/ML
5 INJECTION, SOLUTION INTRAMUSCULAR; INTRAVENOUS EVERY 4 HOURS PRN
Status: DISCONTINUED | OUTPATIENT
Start: 2020-10-09 | End: 2020-10-10 | Stop reason: HOSPADM

## 2020-10-09 RX ADMIN — OXYCODONE HYDROCHLORIDE AND ACETAMINOPHEN 1.5 TABLET: 5; 325 TABLET ORAL at 18:26

## 2020-10-09 RX ADMIN — OXYCODONE HYDROCHLORIDE AND ACETAMINOPHEN 1.5 TABLET: 5; 325 TABLET ORAL at 12:16

## 2020-10-09 RX ADMIN — OXYCODONE HYDROCHLORIDE AND ACETAMINOPHEN 1.5 TABLET: 5; 325 TABLET ORAL at 23:20

## 2020-10-09 RX ADMIN — MORPHINE SULFATE 5 MG: 5 INJECTION, SOLUTION INTRAMUSCULAR; INTRAVENOUS at 08:56

## 2020-10-09 RX ADMIN — QUETIAPINE FUMARATE 25 MG: 25 TABLET ORAL at 22:09

## 2020-10-09 RX ADMIN — CARVEDILOL 25 MG: 25 TABLET, FILM COATED ORAL at 18:26

## 2020-10-09 RX ADMIN — HYDROCHLOROTHIAZIDE 25 MG: 25 TABLET ORAL at 08:35

## 2020-10-09 RX ADMIN — MORPHINE SULFATE 5 MG: 5 INJECTION, SOLUTION INTRAMUSCULAR; INTRAVENOUS at 19:52

## 2020-10-09 RX ADMIN — LOSARTAN POTASSIUM 100 MG: 50 TABLET, FILM COATED ORAL at 08:35

## 2020-10-09 RX ADMIN — CARVEDILOL 25 MG: 25 TABLET, FILM COATED ORAL at 08:35

## 2020-10-09 RX ADMIN — ISOSORBIDE MONONITRATE 30 MG: 30 TABLET, EXTENDED RELEASE ORAL at 08:35

## 2020-10-09 RX ADMIN — AMLODIPINE BESYLATE 5 MG: 5 TABLET ORAL at 08:35

## 2020-10-09 RX ADMIN — PANTOPRAZOLE SODIUM 40 MG: 40 TABLET, DELAYED RELEASE ORAL at 05:34

## 2020-10-09 RX ADMIN — ATORVASTATIN CALCIUM 20 MG: 20 TABLET, FILM COATED ORAL at 22:09

## 2020-10-09 RX ADMIN — MORPHINE SULFATE 5 MG: 5 INJECTION, SOLUTION INTRAMUSCULAR; INTRAVENOUS at 14:24

## 2020-10-09 RX ADMIN — CLOPIDOGREL BISULFATE 75 MG: 75 TABLET ORAL at 08:35

## 2020-10-09 ASSESSMENT — PAIN SCALES - GENERAL
PAINLEVEL_OUTOF10: 8
PAINLEVEL_OUTOF10: 9
PAINLEVEL_OUTOF10: 7
PAINLEVEL_OUTOF10: 9
PAINLEVEL_OUTOF10: 8

## 2020-10-09 ASSESSMENT — ENCOUNTER SYMPTOMS
VOMITING: 0
NAUSEA: 0
DIARRHEA: 0
SHORTNESS OF BREATH: 0

## 2020-10-09 ASSESSMENT — PAIN DESCRIPTION - ORIENTATION
ORIENTATION: RIGHT;LEFT
ORIENTATION: RIGHT;LEFT

## 2020-10-09 ASSESSMENT — PAIN DESCRIPTION - PROGRESSION
CLINICAL_PROGRESSION: NOT CHANGED
CLINICAL_PROGRESSION: NOT CHANGED

## 2020-10-09 ASSESSMENT — PAIN DESCRIPTION - LOCATION
LOCATION: HIP;KNEE
LOCATION: HIP;KNEE

## 2020-10-09 ASSESSMENT — PAIN DESCRIPTION - DESCRIPTORS
DESCRIPTORS: ACHING;DISCOMFORT
DESCRIPTORS: ACHING;DISCOMFORT

## 2020-10-09 ASSESSMENT — PAIN DESCRIPTION - FREQUENCY
FREQUENCY: INTERMITTENT
FREQUENCY: INTERMITTENT

## 2020-10-09 ASSESSMENT — PAIN DESCRIPTION - ONSET
ONSET: ON-GOING
ONSET: ON-GOING

## 2020-10-09 ASSESSMENT — PAIN DESCRIPTION - PAIN TYPE
TYPE: ACUTE PAIN
TYPE: ACUTE PAIN

## 2020-10-09 ASSESSMENT — PAIN - FUNCTIONAL ASSESSMENT
PAIN_FUNCTIONAL_ASSESSMENT: PREVENTS OR INTERFERES WITH MANY ACTIVE NOT PASSIVE ACTIVITIES
PAIN_FUNCTIONAL_ASSESSMENT: PREVENTS OR INTERFERES WITH MANY ACTIVE NOT PASSIVE ACTIVITIES

## 2020-10-09 NOTE — PROGRESS NOTES
Progress Note  Date:10/9/2020       Room:0329/0329-01  Patient Name:Sara Becker     YOB: 1960     Age:60 y.o. Subjective    Subjective:  Symptoms:  Stable. No shortness of breath, malaise, chest pain, weakness, chest pressure, anorexia, diarrhea or anxiety. (Severe hip pain). Diet:  Adequate intake. No nausea or vomiting. Activity level: Impaired due to pain. Pain:  She complains of pain that is severe. Pain is requiring pain medication. Review of Systems   Constitutional: Negative for fever. Respiratory: Negative for shortness of breath. Cardiovascular: Negative for chest pain. Gastrointestinal: Negative for anorexia, diarrhea, nausea and vomiting. Neurological: Negative for weakness. Objective         Vitals Last 24 Hours:  TEMPERATURE:  Temp  Av.2 °F (36.8 °C)  Min: 98.1 °F (36.7 °C)  Max: 98.3 °F (36.8 °C)  RESPIRATIONS RANGE: Resp  Av  Min: 16  Max: 18  PULSE OXIMETRY RANGE: SpO2  Av.5 %  Min: 94 %  Max: 97 %  PULSE RANGE: Pulse  Av.5  Min: 67  Max: 72  BLOOD PRESSURE RANGE: Systolic (19JUG), XKM:523 , Min:91 , MPS:543   ; Diastolic (76YWB), ESM:32, Min:47, Max:63    I/O (24Hr): Intake/Output Summary (Last 24 hours) at 10/9/2020 0844  Last data filed at 10/8/2020 1910  Gross per 24 hour   Intake 600 ml   Output 1100 ml   Net -500 ml     Objective:  General Appearance:  Comfortable. Vital signs: (most recent): Blood pressure 128/63, pulse 72, temperature 98.1 °F (36.7 °C), temperature source Oral, resp. rate 16, height 5' 3\" (1.6 m), weight 179 lb (81.2 kg), last menstrual period 2006, SpO2 94 %, not currently breastfeeding. Vital signs are normal.  No fever. Output: Producing urine. HEENT: Normal HEENT exam.    Lungs:  Normal effort and normal respiratory rate. There are decreased breath sounds. No wheezes or rhonchi. Heart: Normal rate. Regular rhythm. No murmur. Abdomen: Abdomen is distended.   Bowel HISTORY: Reason for exam:->lumbago FINDINGS: BONES/ALIGNMENT: There is mild grade 1 anterolisthesis of L4 on L5. No other spondylolisthesis seen. The vertebral body heights are maintained. The bone marrow signal appears unremarkable. Fusion of the L5-S1 disc space is again noted with an interbody device. There has been posterior fusion and decompression at L4-5 with bilateral pedicle screws that appear in satisfactory position. Severe disc space narrowing is seen at L3-4 and L4-5 with subchondral signal change, consistent with degenerative disc disease. SPINAL CORD: The conus terminates normally. SOFT TISSUES: No paraspinal mass identified. T10-11: There is a mild disc bulge without evidence of significant central canal stenosis. No axial images of this level were done. T11-12: There is a disc bulge with small bilateral paramedian disc protrusions, greater on the right causing moderate central canal stenosis without evidence of cord compression. T12-L1: There is no significant disc herniation, spinal canal stenosis or neural foraminal narrowing. L1-L2: There is no significant disc herniation, spinal canal stenosis or neural foraminal narrowing. L2-L3: There is no significant disc herniation, spinal canal stenosis or neural foraminal narrowing. There is mild disc bulge and moderate bilateral posterior facet degenerative change. L3-L4: There is a disc bulge and moderate bilateral posterior facet degenerative change causing moderate central canal stenosis, worse compared to the prior study. There is moderate left and mild right neural foraminal encroachment. L4-L5: There has been improvement of spinal stenosis seen previously at this level. There is slight extrinsic compression at the left posterolateral aspect of the spinal canal related to bony hypertrophy. No significant central canal stenosis or neural foraminal encroachment seen.  L5-S1: There is no significant disc herniation, spinal canal stenosis or neural foraminal narrowing. There is also posterior fusion at this level. 1. Advanced degenerative change. Grade 1 anterolisthesis at L4-5. 2. Prior fusion at L4-5 and L5-S1. No evidence of recurrent central canal stenosis or disc herniation at these levels. 3. Moderate central canal stenosis at L3-4, worse compared to 09/04/2012. Moderate left and mild right neural foraminal encroachment. Vl Dup Carotid Bilateral    Result Date: 10/8/2020  EXAMINATION: ULTRASOUND EVALUATION OF THE CAROTID ARTERIES 10/8/2020 COMPARISON: 07/09/2016 HISTORY: ORDERING SYSTEM PROVIDED HISTORY: weakness TECHNOLOGIST PROVIDED HISTORY: Reason for exam:->weakness What reading provider will be dictating this exam?->CRC FINDINGS: RIGHT: The right common carotid artery demonstrates peak systolic velocities of 82, 101, 77 cm/sec in the proximal, mid and distal segments respectively. The right internal carotid artery demonstrates the systolic velocities of 63, 88, 69 cm/sec in the proximal, mid and distal segments respectively. The external carotid artery is patent. The vertebral artery demonstrates normal antegrade flow. No evidence of significant focal atherosclerotic plaque. ICA/CCA ratio of 0.9. LEFT: The left common carotid artery demonstrates peak systolic velocities of 97, 80, 91 cm/sec in the proximal, mid and distal segments respectively. The left internal carotid artery demonstrates the systolic velocities of 87, 62, 69 cm/sec in the proximal, mid and distal segments respectively. The external carotid artery is patent. The vertebral artery demonstrates normal antegrade flow. No evidence of significant focal atherosclerotic plaque. ICA/CCA ratio of 0.9. The right internal carotid artery demonstrates 0-50% stenosis. The left internal carotid artery demonstrates 0-50% stenosis. Bilateral vertebral arteries are patent with flow in the normal direction.      Mri Brain Wo Contrast    Result Date: 10/8/2020  EXAMINATION: MRI OF THE BRAIN WITHOUT CONTRAST  10/8/2020 12:15 pm TECHNIQUE: Multiplanar multisequence MRI of the brain was performed without the administration of intravenous contrast. COMPARISON: CT head 12 hours prior. HISTORY: ORDERING SYSTEM PROVIDED HISTORY: TIA TECHNOLOGIST PROVIDED HISTORY: Reason for exam:->TIA FINDINGS: The ventricular, sulcal, and cisternal spaces are age-appropriate. The pituitary gland is normal in size. The cerebellar tonsils are normal in position. The vascular arterial flow voids at the skull base appears patent. There are minimal patchy and confluent FLAIR hyperintensities within the periventricular and subcortical white matter, statistically most compatible with age-related mild chronic microvascular ischemic changes. There is no evidence for intracranial hemorrhage, mass lesion, or acute infarct. There is no extra-axial fluid collection or midline shift. The orbital globes and retrobulbar structures appear grossly unremarkable. There are small mucous retention cysts versus polyps within the bilateral sphenoid sinuses. The mastoid air cells are well aerated. Essentially unremarkable MRI brain for age. No restricted diffusion to suggest an acute infarct. Assessment//Plan           Hospital Problems           Last Modified POA    * (Principal) Lumbago 10/8/2020 Yes    TIA (transient ischemic attack) 10/7/2020 Yes        Assessment:  (Principal Problem:    Degenerative arthritis of lumbar spine  Active Problems:    Herniated lumbar intervertebral disc    Anxiety    Hypertension    Arthritis    TIA (transient ischemic attack)    Lumbago  Resolved Problems:    * No resolved hospital problems. *      ). Plan:   (Mri hips  Ct mri brain  normall).        Electronically signed by Ryanne Dickson MD on 10/9/20 at 8:44 AM EDT

## 2020-10-09 NOTE — CONSULTS
Department of Orthopedic Surgery  Resident Consult Note          Reason for Consult:  Bilateral hip and knee pain    HISTORY OF PRESENT ILLNESS:       Patient is a 63 y.o. female who presents with bilateral hip and knee pain which has been ongoing for a number of years. Patient states as of late her symptoms have been worsening to the point it is affecting her ambulation. She denies any recent trauma or inciting injury. She states both legs are equally affected and the pain radiates from her hips into her distal lower extremities. She also admits to weakness in both legs and a feeling of giving out in her knees. She has a past surgical history significant for lumbar spine surgery with Dr. Zoya Frye in 2012. She has not seen him for follow up in the recent past.  She is overall frustrated and would like to achieve relief of her bilateral leg pain. She was enrolled in water therapy until Covid occurred and she had to stop. She stated it was helping with her balance overall.       Past Medical History:        Diagnosis Date    Arthritis     Bursitis     right arm    Chest pain 12/06/2016    lexiscan stress    COPD exacerbation ([address] 2/12/2015    H/O cardiovascular stress test 10/20/2018    Lexiscan    Hypertension     MVP (mitral valve prolapse)      Past Surgical History:        Procedure Laterality Date    ABDOMEN SURGERY      APPENDECTOMY      BACK SURGERY      disc fracture in lumbar spine removed, neck same    EYE SURGERY      KNEE SURGERY      cyst on knee    LUMBAR FUSION  3/28/13    plif  L4-L5    TONSILLECTOMY      UPPER GASTROINTESTINAL ENDOSCOPY N/A 8/18/2018    EGD BIOPSY performed by Juli Nolan MD at [address] [place] [address] N/A 5/22/2019    EGD BIOPSY performed by Bandar Rizo MD at [facility] ENDOSCOPY     Current Medications:   Current Facility-Administered Medications: morphine sulfate (PF) injection 5 mg, 5 mg, Intravenous, Q4H PRN  QUEtiapine (SEROQUEL) tablet 25 mg, 25 mg, Oral, Nightly  atorvastatin (LIPITOR) tablet 20 mg, 20 mg, Oral, Nightly  clopidogrel (PLAVIX) tablet 75 mg, 75 mg, Oral, Daily  acetaminophen (TYLENOL) tablet 500 mg, 500 mg, Oral, Q6H PRN  amLODIPine (NORVASC) tablet 5 mg, 5 mg, Oral, Daily  carvedilol (COREG) tablet 25 mg, 25 mg, Oral, BID WC  isosorbide mononitrate (IMDUR) extended release tablet 30 mg, 30 mg, Oral, Daily  oxyCODONE-acetaminophen (PERCOCET) 5-325 MG per tablet 1.5 tablet, 1.5 tablet, Oral, Q4H PRN  pantoprazole (PROTONIX) tablet 40 mg, 40 mg, Oral, QAM AC  sucralfate (CARAFATE) tablet 1 g, 1 g, Oral, Q12H PRN  losartan (COZAAR) tablet 100 mg, 100 mg, Oral, Daily **AND** hydroCHLOROthiazide (HYDRODIURIL) tablet 25 mg, 25 mg, Oral, Daily  perflutren lipid microspheres (DEFINITY) injection 1.65 mg, 1.5 mL, Intravenous, ONCE PRN  Allergies:  Aspirin; Prochlorperazine edisylate; Tegretol [carbamazepine]; and Compazine [prochlorperazine maleate]    Social History:   TOBACCO:   reports that she has been smoking cigarettes. She has a 11.00 pack-year smoking history. She has never used smokeless tobacco.  ETOH:   reports previous alcohol use. DRUGS:   reports no history of drug use.   ACTIVITIES OF DAILY LIVING:    OCCUPATION:    Family History:       Problem Relation Age of Onset    Cancer Mother     Arthritis Mother     Asthma Mother     Depression Mother     Diabetes Mother     Heart Disease Mother     High Blood Pressure Mother     High Cholesterol Mother     Arthritis Father     Asthma Father     Cancer Father     Depression Father     Diabetes Father     Heart Disease Father     High Blood Pressure Father     High Cholesterol Father        REVIEW OF SYSTEMS:  CONSTITUTIONAL:  negative for  fevers, chills  EYES:  negative for blurred vision, visual disturbance  HEENT:  negative for  hearing loss, voice change  RESPIRATORY:  negative for  dyspnea, wheezing  CARDIOVASCULAR:  negative for  chest pain,

## 2020-10-09 NOTE — PLAN OF CARE
Problem: Pain:  Goal: Pain level will decrease  Description: Pain level will decrease  10/9/2020 1123 by Marium Murillo RN  Outcome: Met This Shift  10/8/2020 2246 by Selene Swain RN  Outcome: Met This Shift  Goal: Control of acute pain  Description: Control of acute pain  10/9/2020 1123 by Marium Muirllo RN  Outcome: Met This Shift  10/8/2020 2246 by Selene Swain RN  Outcome: Met This Shift     Problem: Falls - Risk of:  Goal: Will remain free from falls  Description: Will remain free from falls  10/9/2020 1123 by Marium Murillo RN  Outcome: Met This Shift  10/8/2020 2246 by Selene Swain RN  Outcome: Met This Shift  Goal: Absence of physical injury  Description: Absence of physical injury  10/9/2020 1123 by Marium Murillo RN  Outcome: Met This Shift  10/8/2020 2246 by Selene Swain RN  Outcome: Met This Shift     Problem: Mobility - Impaired:  Goal: Mobility will improve  Description: Mobility will improve  Outcome: Met This Shift

## 2020-10-09 NOTE — PROGRESS NOTES
Patient refusing bed alarm, explained the need for it and for the patient to call for help when needing to use the restroom, and she stated \"if I waited for you guys I would pee on myself. \"  Bed alarm refusal signed, will continue to educate.

## 2020-10-10 VITALS
OXYGEN SATURATION: 96 % | SYSTOLIC BLOOD PRESSURE: 116 MMHG | HEIGHT: 63 IN | HEART RATE: 71 BPM | RESPIRATION RATE: 18 BRPM | TEMPERATURE: 97.6 F | DIASTOLIC BLOOD PRESSURE: 60 MMHG | WEIGHT: 179 LBS | BODY MASS INDEX: 31.71 KG/M2

## 2020-10-10 PROBLEM — S73.192A ACETABULAR LABRUM TEAR, LEFT, INITIAL ENCOUNTER: Status: ACTIVE | Noted: 2020-10-10

## 2020-10-10 PROBLEM — S73.191A ACETABULAR LABRUM TEAR, RIGHT, INITIAL ENCOUNTER: Status: ACTIVE | Noted: 2020-10-10

## 2020-10-10 PROCEDURE — G0378 HOSPITAL OBSERVATION PER HR: HCPCS

## 2020-10-10 PROCEDURE — 6360000002 HC RX W HCPCS: Performed by: FAMILY MEDICINE

## 2020-10-10 PROCEDURE — 96376 TX/PRO/DX INJ SAME DRUG ADON: CPT

## 2020-10-10 PROCEDURE — 6370000000 HC RX 637 (ALT 250 FOR IP): Performed by: FAMILY MEDICINE

## 2020-10-10 PROCEDURE — 6370000000 HC RX 637 (ALT 250 FOR IP): Performed by: PSYCHIATRY & NEUROLOGY

## 2020-10-10 RX ADMIN — PANTOPRAZOLE SODIUM 40 MG: 40 TABLET, DELAYED RELEASE ORAL at 06:11

## 2020-10-10 RX ADMIN — LOSARTAN POTASSIUM 100 MG: 50 TABLET, FILM COATED ORAL at 08:03

## 2020-10-10 RX ADMIN — HYDROCHLOROTHIAZIDE 25 MG: 25 TABLET ORAL at 08:03

## 2020-10-10 RX ADMIN — MORPHINE SULFATE 5 MG: 5 INJECTION, SOLUTION INTRAMUSCULAR; INTRAVENOUS at 13:34

## 2020-10-10 RX ADMIN — OXYCODONE HYDROCHLORIDE AND ACETAMINOPHEN 1.5 TABLET: 5; 325 TABLET ORAL at 10:48

## 2020-10-10 RX ADMIN — AMLODIPINE BESYLATE 5 MG: 5 TABLET ORAL at 08:04

## 2020-10-10 RX ADMIN — CARVEDILOL 25 MG: 25 TABLET, FILM COATED ORAL at 08:03

## 2020-10-10 RX ADMIN — OXYCODONE HYDROCHLORIDE AND ACETAMINOPHEN 1.5 TABLET: 5; 325 TABLET ORAL at 06:11

## 2020-10-10 RX ADMIN — MORPHINE SULFATE 5 MG: 5 INJECTION, SOLUTION INTRAMUSCULAR; INTRAVENOUS at 08:04

## 2020-10-10 RX ADMIN — ISOSORBIDE MONONITRATE 30 MG: 30 TABLET, EXTENDED RELEASE ORAL at 08:03

## 2020-10-10 RX ADMIN — CLOPIDOGREL BISULFATE 75 MG: 75 TABLET ORAL at 08:04

## 2020-10-10 ASSESSMENT — PAIN DESCRIPTION - PROGRESSION
CLINICAL_PROGRESSION: GRADUALLY IMPROVING

## 2020-10-10 ASSESSMENT — ENCOUNTER SYMPTOMS
NAUSEA: 0
SHORTNESS OF BREATH: 0
DIARRHEA: 0
VOMITING: 0
COUGH: 0

## 2020-10-10 ASSESSMENT — PAIN SCALES - GENERAL
PAINLEVEL_OUTOF10: 8
PAINLEVEL_OUTOF10: 10

## 2020-10-10 ASSESSMENT — PAIN DESCRIPTION - DESCRIPTORS: DESCRIPTORS: ACHING

## 2020-10-10 ASSESSMENT — PAIN DESCRIPTION - PAIN TYPE: TYPE: ACUTE PAIN

## 2020-10-10 ASSESSMENT — PAIN DESCRIPTION - LOCATION: LOCATION: BACK;HIP

## 2020-10-10 NOTE — PLAN OF CARE
Problem: Pain:  Goal: Pain level will decrease  Description: Pain level will decrease  10/9/2020 2055 by Ronni Ureña RN  Outcome: Met This Shift     Problem: Pain:  Goal: Control of acute pain  Description: Control of acute pain  10/9/2020 2055 by Ronni Ureña RN  Outcome: Met This Shift     Problem: Pain:  Goal: Control of chronic pain  Description: Control of chronic pain  Outcome: Met This Shift     Problem: Falls - Risk of:  Goal: Will remain free from falls  Description: Will remain free from falls  10/9/2020 2055 by Ronni Ureña RN  Outcome: Met This Shift     Problem: Falls - Risk of:  Goal: Absence of physical injury  Description: Absence of physical injury  10/9/2020 2055 by Ronni Ureña RN  Outcome: Met This Shift     Problem: Mobility - Impaired:  Goal: Mobility will improve  Description: Mobility will improve  10/9/2020 2055 by Ronni Ureña RN  Outcome: Met This Shift

## 2020-10-10 NOTE — PROGRESS NOTES
05/21/2019, right hip plain radiographs from 05/20/2019 HISTORY: ORDERING SYSTEM PROVIDED HISTORY: pain TECHNOLOGIST PROVIDED HISTORY: Reason for exam:->pain 58-year-old female with bilateral hip pain FINDINGS: BONE MARROW: Susceptibility artifact in the lower lumbar spine/lumbosacral spine consistent with spinal fusion hardware. Visualized sacral ala, iliac wings, acetabula, pubic rami, and proximal femurs demonstrate normal bone marrow signal intensity. No signal changes at the femoral heads to suggest femoral head AVN. HIP JOINT: Both femoral heads are properly located within the bilateral acetabula without clear evidence for acute fracture, dislocation or femoral head flattening. Mild osteophyte spurring at the margins of the bilateral acetabula and femoral heads. No sizable hip joint effusions. No acute fracture or dislocation. LABRUM: No paralabral cyst formation. Small linear left acetabular labral tear. No right acetabular labral tear. BURSAE: No significant fluid in the bilateral iliopsoas or greater trochanteric bursa. SCIATIC NERVE: Visualized proximal sciatic nerves demonstrate normal course, contour, and caliber on limited coronal T1 weighted imaging. MUSCLES / TENDONS: Low-grade partial-thickness tearing at the origin of the bilateral hamstring tendons at the bilateral ischial tuberosities. Visualized muscles/tendons appear grossly intact without evidence of tearing. INTRAPELVIC CONTENTS / SOFT TISSUES: Moderate stool burden. 1. Mild bilateral hip osteoarthrosis. No acute fracture or dislocation. No femoral head AVN. 2. Small linear left acetabular labral tear. No right acetabular labral tear. 3. Low-grade partial-thickness tearing at the origin of the bilateral hamstring tendons at the bilateral ischial tuberosities. 4. Moderate stool burden. 5. Postsurgical changes and susceptibility artifact in the lower lumbar/lumbosacral spine.      Mri Hip Left Wo Contrast    Result Date: 10/9/2020  EXAMINATION: MRI OF THE RIGHT HIP WITHOUT CONTRAST; MRI OF THE LEFT HIP WITHOUT CONTRAST, 10/9/2020 9:51 am TECHNIQUE: Multiplanar multisequence MRI of the right hip was performed without the administration of intravenous contrast.; Multiplanar multisequence MRI of the hip was performed without the administration of intravenous contrast. COMPARISON: 05/21/2019, right hip plain radiographs from 05/20/2019 HISTORY: ORDERING SYSTEM PROVIDED HISTORY: pain TECHNOLOGIST PROVIDED HISTORY: Reason for exam:->pain 67-year-old female with bilateral hip pain FINDINGS: BONE MARROW: Susceptibility artifact in the lower lumbar spine/lumbosacral spine consistent with spinal fusion hardware. Visualized sacral ala, iliac wings, acetabula, pubic rami, and proximal femurs demonstrate normal bone marrow signal intensity. No signal changes at the femoral heads to suggest femoral head AVN. HIP JOINT: Both femoral heads are properly located within the bilateral acetabula without clear evidence for acute fracture, dislocation or femoral head flattening. Mild osteophyte spurring at the margins of the bilateral acetabula and femoral heads. No sizable hip joint effusions. No acute fracture or dislocation. LABRUM: No paralabral cyst formation. Small linear left acetabular labral tear. No right acetabular labral tear. BURSAE: No significant fluid in the bilateral iliopsoas or greater trochanteric bursa. SCIATIC NERVE: Visualized proximal sciatic nerves demonstrate normal course, contour, and caliber on limited coronal T1 weighted imaging. MUSCLES / TENDONS: Low-grade partial-thickness tearing at the origin of the bilateral hamstring tendons at the bilateral ischial tuberosities. Visualized muscles/tendons appear grossly intact without evidence of tearing. INTRAPELVIC CONTENTS / SOFT TISSUES: Moderate stool burden. 1. Mild bilateral hip osteoarthrosis. No acute fracture or dislocation. No femoral head AVN.  2. Small linear left acetabular labral tear. No right acetabular labral tear. 3. Low-grade partial-thickness tearing at the origin of the bilateral hamstring tendons at the bilateral ischial tuberosities. 4. Moderate stool burden. 5. Postsurgical changes and susceptibility artifact in the lower lumbar/lumbosacral spine. Vl Dup Carotid Bilateral    Result Date: 10/8/2020  EXAMINATION: ULTRASOUND EVALUATION OF THE CAROTID ARTERIES 10/8/2020 COMPARISON: 07/09/2016 HISTORY: ORDERING SYSTEM PROVIDED HISTORY: weakness TECHNOLOGIST PROVIDED HISTORY: Reason for exam:->weakness What reading provider will be dictating this exam?->CRC FINDINGS: RIGHT: The right common carotid artery demonstrates peak systolic velocities of 82, 101, 77 cm/sec in the proximal, mid and distal segments respectively. The right internal carotid artery demonstrates the systolic velocities of 63, 88, 69 cm/sec in the proximal, mid and distal segments respectively. The external carotid artery is patent. The vertebral artery demonstrates normal antegrade flow. No evidence of significant focal atherosclerotic plaque. ICA/CCA ratio of 0.9. LEFT: The left common carotid artery demonstrates peak systolic velocities of 97, 80, 91 cm/sec in the proximal, mid and distal segments respectively. The left internal carotid artery demonstrates the systolic velocities of 87, 62, 69 cm/sec in the proximal, mid and distal segments respectively. The external carotid artery is patent. The vertebral artery demonstrates normal antegrade flow. No evidence of significant focal atherosclerotic plaque. ICA/CCA ratio of 0.9. The right internal carotid artery demonstrates 0-50% stenosis. The left internal carotid artery demonstrates 0-50% stenosis. Bilateral vertebral arteries are patent with flow in the normal direction.      Mri Brain Wo Contrast    Result Date: 10/8/2020  EXAMINATION: MRI OF THE BRAIN WITHOUT CONTRAST  10/8/2020 12:15 pm TECHNIQUE: Multiplanar multisequence MRI of the brain was performed without the administration of intravenous contrast. COMPARISON: CT head 12 hours prior. HISTORY: ORDERING SYSTEM PROVIDED HISTORY: TIA TECHNOLOGIST PROVIDED HISTORY: Reason for exam:->TIA FINDINGS: The ventricular, sulcal, and cisternal spaces are age-appropriate. The pituitary gland is normal in size. The cerebellar tonsils are normal in position. The vascular arterial flow voids at the skull base appears patent. There are minimal patchy and confluent FLAIR hyperintensities within the periventricular and subcortical white matter, statistically most compatible with age-related mild chronic microvascular ischemic changes. There is no evidence for intracranial hemorrhage, mass lesion, or acute infarct. There is no extra-axial fluid collection or midline shift. The orbital globes and retrobulbar structures appear grossly unremarkable. There are small mucous retention cysts versus polyps within the bilateral sphenoid sinuses. The mastoid air cells are well aerated. Essentially unremarkable MRI brain for age. No restricted diffusion to suggest an acute infarct. Assessment//Plan           Hospital Problems           Last Modified POA    * (Principal) Degenerative arthritis of lumbar spine 10/9/2020 Yes    Herniated lumbar intervertebral disc 10/9/2020 Yes    Anxiety 10/9/2020 Yes    Hypertension 10/9/2020 Yes    Arthritis 10/9/2020 Yes    TIA (transient ischemic attack) 10/7/2020 Yes    Lumbago 10/9/2020 Yes        Assessment:  (Principal Problem:    Degenerative arthritis of lumbar spine  Active Problems:    Herniated lumbar intervertebral disc    Anxiety    Hypertension    Arthritis    TIA (transient ischemic attack)    Lumbago    Acetabular labrum tear, left, initial encounter    Acetabular labrum tear, right, initial encounter  Resolved Problems:    * No resolved hospital problems. *   ). Plan:   (Mri bilat acetabulum tears  pt).        Electronically signed by Rachel Julian MD on 10/10/20 at 9:09 AM EDT

## 2020-10-14 ENCOUNTER — TELEPHONE (OUTPATIENT)
Dept: ORTHOPEDIC SURGERY | Age: 60
End: 2020-10-14

## 2020-10-22 ENCOUNTER — OFFICE VISIT (OUTPATIENT)
Dept: ORTHOPEDIC SURGERY | Age: 60
End: 2020-10-22
Payer: COMMERCIAL

## 2020-10-22 VITALS — TEMPERATURE: 98 F | HEIGHT: 63 IN | BODY MASS INDEX: 31.89 KG/M2 | WEIGHT: 180 LBS

## 2020-10-22 PROCEDURE — 99203 OFFICE O/P NEW LOW 30 MIN: CPT | Performed by: ORTHOPAEDIC SURGERY

## 2020-10-22 NOTE — PROGRESS NOTES
Chief Complaint   Patient presents with    Hip Pain     Bilateral Hip, x couple months, no known injury, referral from [address] [place] [address], had MRI's done        Abraham Doe  Is a 63 y.o.  female who presents today complaining of bilateral hip pain. She states that the pain began 3  month(s) ago. She did not have a history of injury. Patients states pain is located lateral, over greater trochanter, anterior, posterior and has tenderness over the  Lateral, Anterior and Posterior portion of the hip. Hip pain is described as numbing and tingling and  is aggravated by walking along with buttock and lateral discomfort. She states the pain occurs in the  no apparent pattern. Patient states hip pain is relieved by rest. Patient does have a history of back pain. The patient does not use ambulatory aid.       Past Medical History:   Diagnosis Date    Arthritis     Bursitis     right arm    Chest pain 12/06/2016    lexiscan stress    COPD exacerbation ([facility] [address] 2/12/2015    H/O cardiovascular stress test 10/20/2018    Lexiscan    Hypertension     MVP (mitral valve prolapse)      Past Surgical History:   Procedure Laterality Date    ABDOMEN SURGERY      APPENDECTOMY      BACK SURGERY      disc fracture in lumbar spine removed, neck same    EYE SURGERY      KNEE SURGERY      cyst on knee    LUMBAR FUSION  3/28/13    plif  L4-L5    TONSILLECTOMY      UPPER GASTROINTESTINAL ENDOSCOPY N/A 8/18/2018    EGD BIOPSY performed by Christiano Rosa MD at [address] [place] [address] 5/22/2019    EGD BIOPSY performed by Emil Lemus MD at [place] [address]       Current Outpatient Medications:     promethazine (PHENERGAN) 25 MG tablet, Take 25 mg by mouth every 6 hours as needed for Nausea, Disp: , Rfl:     oxyCODONE-acetaminophen (PERCOCET) 7.5-325 MG per tablet, Take 1 tablet by mouth every 6 hours as needed for Pain., Disp: , Rfl:     isosorbide mononitrate (IMDUR) 30 MG extended release tablet, Take 1 tablet by mouth daily, Disp: 30 tablet, Rfl: 3    carvedilol (COREG) 25 MG tablet, Take 25 mg by mouth 2 times daily (with meals), Disp: , Rfl:     amLODIPine (NORVASC) 5 MG tablet, Take 5 mg by mouth daily, Disp: , Rfl:     losartan-hydrochlorothiazide (HYZAAR) 100-25 MG per tablet, Take 1 tablet by mouth daily, Disp: , Rfl:     sucralfate (CARAFATE) 1 GM tablet, Take 1 g by mouth every 12 hours as needed , Disp: , Rfl:     pantoprazole (PROTONIX) 40 MG tablet, Take 1 tablet by mouth every morning (before breakfast), Disp: 30 tablet, Rfl: 3  Allergies   Allergen Reactions    Aspirin Swelling    Prochlorperazine Edisylate Swelling    Tegretol [Carbamazepine] Swelling    Compazine [Prochlorperazine Maleate]      Social History     Socioeconomic History    Marital status:      Spouse name: Not on file    Number of children: 1    Years of education: Not on file    Highest education level: Not on file   Occupational History    Not on file   Social Needs    Financial resource strain: Not on file    Food insecurity     Worry: Not on file     Inability: Not on file   [employer] needs     Medical: Not on file     Non-medical: Not on file   Tobacco Use    Smoking status: Current Every Day Smoker     Packs/day: 0.25     Years: 44.00     Pack years: 11.00     Types: Cigarettes    Smokeless tobacco: Never Used    Tobacco comment: trying to quit   Substance and Sexual Activity    Alcohol use: Not Currently     Comment: very occasional    Drug use: No    Sexual activity: Not Currently   Lifestyle    Physical activity     Days per week: Not on file     Minutes per session: Not on file    Stress: Not on file   Relationships    Social connections     Talks on phone: Not on file     Gets together: Not on file     Attends [employer] service: Not on file     Active member of club or organization: Not on file     Attends meetings of clubs or organizations: Not on file     Relationship status: Not on file    Intimate partner violence     Fear of current or ex partner: Not on file     Emotionally abused: Not on file     Physically abused: Not on file     Forced sexual activity: Not on file   Other Topics Concern    Not on file   Social History Narrative    Not on file     Family History   Problem Relation Age of Onset    Cancer Mother     Arthritis Mother     Asthma Mother     Depression Mother     Diabetes Mother     Heart Disease Mother     High Blood Pressure Mother     High Cholesterol Mother     Arthritis Father     Asthma Father     Cancer Father     Depression Father     Diabetes Father     Heart Disease Father     High Blood Pressure Father     High Cholesterol Father          REVIEW OF SYSTEMS:     General/Constitution:  (-)weight loss, (-)fever, (-)chills, (-)weakness. Skin: (-) rash,(-) psoriasis,(-) eczema, (-)skin cancer. Musculoskeletal: (-) fractures,  (-) dislocations,(-) collagen vascular disease, (-) fibromyalgia, (-) multiple sclerosis, (-) muscular dystrophy, (-) RSD,(-) joint pain (-)swelling, (-) joint pain,swelling. Neurologic: (-) epilepsy, (-)seizures,(-) brain tumor,(-) TIA, (-)stroke, (-)headaches, (-)Parkinson disease,(-) memory loss, (-) LOC. Cardiovascular: (-) Chest pain, (-) swelling in legs/feet, (-) SOB, (-) cramping in legs/feet with walking. Respiratory: (-) SOB, (-) Coughing, (-) night sweats. GI: (-) nausea, (-) vomiting, (-) diarrhea, (-) blood in stool, (-) gastric ulcer. Psychiatric: (-) Depression, (-) Anxiety, (-) bipolar disease, (-) Alzheimer's Disease  Allergic/Immunologic: (-) allergies latex, (-) allergies metal, (-) skin sensitivity.   Hematlogic: (-) anemia, (-) blood transfusion, (-) DVT/PE, (-) Clotting disorders      Subjective:    Constitution:    Temp 98 °F (36.7 °C)   Ht 5' 3\" (1.6 m)   Wt 180 lb (81.6 kg)   LMP 03/01/2006   BMI 31.89 kg/m²     Psycihatric:  The patient is alert and oriented x 3, appears to be stated age and in no distress. Respiratory:  Respiratory effort is not labored. Patient is not gasping. Palpation of the chest reveals no tactile fremitus. Skin:  Upon inspection: the skin appears warm, dry and intact. There is  a previous scar over the affected area. There is not any cellulitis, lymphedema or cutaneous lesions noted in the lower extremities. Upon palpation there is no induration noted. Neurologic:  Motor exam of the lower extremities show ; quadriceps, hamstrings, foot dorsi and plantar flexors intact R.  5/5 and L. 5/5. Deep tendon reflexes are 2/4 at the knees and 2/4 at the ankles with strong extensor hallicus longus motor strength bilaterally. Sensory to both feet is intact to all sensory roots, except l3-l4. Cardiovascular: The vascular exam is normal and is well perfused to distal extremities. Distal pulses DP/PT: R. 2+; L. 2+. There is cap refill noted less than two seconds in all digits. There is not edema of the bilateral lower extremities. There is not varicosities noted in the distal extremities. Lymph:  Upon palpation,  there is no lymphadenopathy noted in bilateral lower extremities. Musculoskeletal:  Gait: antalgic;  Examination of the digits and nails reveal no cyanosis or clubbing    Lumbar exam:  On visual inspection, there is not deformity of the spine. antalgic gait, limited range of motion, pain with motion noted during exam, tenderness noted to anterior thigh. Special tests: Straight Leg Raise positive, Ruthy test negative. Hip exam:  Upon visual inspection there is not a deformity noted. Patient complains of tenderness at the  Buttock and anterior thigh. Exam of the bilateral hip shows none leg length discrepancy. Range of motion of the involved/uninvolved hip is 20 degrees internal rotation and 30 degrees external rotation/20 degrees internal rotation and 30 degrees external rotation, the hip joint is stable to testing.  Strength Primary osteoarthritis of left hip     Spinal stenosis of lumbar region with neurogenic claudication        Plan:  Natural history and expected course discussed. Questions answered. Educational materials distributed. Home exercises discussed. NSAIDs per medication orders. I had a lengthy discussion with the patient regarding their diagnosis. I explained treatment options including surgical vs non surgical treatment. I reviewed in detail the risks and benefits and outlined the procedure in detail with expected outcomes and possible complications. I also discussed non surgical treatment such as injections (CSI ), physical therapy, topical creams and NSAID's. They have elected for conservaitve management at this time.      Dr Zane Keane referral for possible bilateral hip csi verses lumbar epidural

## 2020-11-03 PROBLEM — R07.9 CHEST PAIN: Status: RESOLVED | Noted: 2018-10-19 | Resolved: 2020-11-03

## 2020-11-12 ENCOUNTER — OFFICE VISIT (OUTPATIENT)
Dept: PHYSICAL MEDICINE AND REHAB | Age: 60
End: 2020-11-12
Payer: COMMERCIAL

## 2020-11-12 VITALS
DIASTOLIC BLOOD PRESSURE: 87 MMHG | SYSTOLIC BLOOD PRESSURE: 149 MMHG | HEIGHT: 63 IN | HEART RATE: 84 BPM | BODY MASS INDEX: 33.31 KG/M2 | WEIGHT: 188 LBS

## 2020-11-12 PROCEDURE — 99204 OFFICE O/P NEW MOD 45 MIN: CPT | Performed by: PHYSICAL MEDICINE & REHABILITATION

## 2020-11-12 RX ORDER — QUETIAPINE FUMARATE 25 MG/1
25 TABLET, FILM COATED ORAL NIGHTLY
COMMUNITY
Start: 2020-11-01

## 2020-11-12 NOTE — PROGRESS NOTES
Isha Carson, [address] [facility] Physical Medicine and Rehabilitation  [address] [facility] [place]  Phone: [phone]  Fax: [phone]    PCP: Allison Staton MD  Date of visit: 11/12/20    Chief Complaint   Patient presents with    Hip Pain     new patient        Dear Dr. Steve Hodge,     Thank you for referring your patient to be seen. As you know,  Janay Yousif is a 63 y.o. female with past medical history as below who presents with diffuse complaints of hip, buttock and leg pain and trouble walking that started a few months ago. There was a sudden onset of  pain after no known injury. Now, the pain is constant and occurs daily. The pain is rated Pain Score:   9, is described as aching, sharp, shooting, burning, and is located in bilateral groins, buttocks, hips, knees, feet, ankles. Pain is very diffuse and migrates. She has no back pain. She states she has a feeling of getting stuck when she walks. The symptoms have been better since onset. The pain is better with nothing. The pain is worse with standing and walking. There is associated numbness/tingling. There is weakness. There is no bowel/bladder changes. She states she had labwork done and was told she had PMR. I have no records of this. The prior workup has included: Xray, MRI bilateral hips, MRI L spine .      The prior treatment has included:  PT: none    Chiropractic: none    Modalities: none    OTC Tylenol: none   Prednisone    NSAIDS: none   Opioids: yes with no relief   Membrane stabilizers: none  Muscle relaxers: none   Previous injections: none    Previous surgery at this site: lumbar fusion    Allergies   Allergen Reactions    Aspirin Swelling    Prochlorperazine Edisylate Swelling    Tegretol [Carbamazepine] Swelling    Compazine [Prochlorperazine Maleate]        Current Outpatient Medications   Medication Sig Dispense Refill    QUEtiapine (SEROQUEL) 25 MG tablet Take 25 mg by mouth nightly  promethazine (PHENERGAN) 25 MG tablet Take 25 mg by mouth every 6 hours as needed for Nausea      oxyCODONE-acetaminophen (PERCOCET) 7.5-325 MG per tablet Take 1 tablet by mouth every 6 hours as needed for Pain.  isosorbide mononitrate (IMDUR) 30 MG extended release tablet Take 1 tablet by mouth daily 30 tablet 3    carvedilol (COREG) 25 MG tablet Take 25 mg by mouth 2 times daily (with meals)      amLODIPine (NORVASC) 5 MG tablet Take 5 mg by mouth daily      losartan-hydrochlorothiazide (HYZAAR) 100-25 MG per tablet Take 1 tablet by mouth daily      sucralfate (CARAFATE) 1 GM tablet Take 1 g by mouth every 12 hours as needed       pantoprazole (PROTONIX) 40 MG tablet Take 1 tablet by mouth every morning (before breakfast) 30 tablet 3     No current facility-administered medications for this visit.         Past Medical History:   Diagnosis Date    Arthritis     Bursitis     right arm    Chest pain 12/06/2016    lexiscan stress    COPD exacerbation ([address] 2/12/2015    H/O cardiovascular stress test 10/20/2018    Lexiscan    Hypertension     MVP (mitral valve prolapse)        Past Surgical History:   Procedure Laterality Date    ABDOMEN SURGERY      APPENDECTOMY      BACK SURGERY      disc fracture in lumbar spine removed, neck same    EYE SURGERY      KNEE SURGERY      cyst on knee    LUMBAR FUSION  3/28/13    plif  L4-L5    TONSILLECTOMY      UPPER GASTROINTESTINAL ENDOSCOPY N/A 8/18/2018    EGD BIOPSY performed by Ana Laura Ornelas MD at [address] [place] [address] N/A 5/22/2019    EGD BIOPSY performed by Sigurd Dandy, MD at [address] [facility] History   Problem Relation Age of Onset    Cancer Mother     Arthritis Mother     Asthma Mother     Depression Mother     Diabetes Mother     Heart Disease Mother     High Blood Pressure Mother     High Cholesterol Mother     Arthritis Father     Asthma Father     Cancer Father     Depression Father    Rossi Ram Diabetes Father     Heart Disease Father     High Blood Pressure Father     High Cholesterol Father        Social History     Tobacco Use    Smoking status: Current Every Day Smoker     Packs/day: 0.25     Years: 44.00     Pack years: 11.00     Types: Cigarettes    Smokeless tobacco: Never Used    Tobacco comment: trying to quit   Substance Use Topics    Alcohol use: Not Currently     Comment: very occasional    Drug use: No          Functional Status: The patient is able to ambulate and perform activities of daily living without the use of an assistive device. ROS: For more complete ROS answered by the patient, please see . Constitutional: Denies fevers, chills, night sweats, unintentional weight loss     Skin: Denies rash or skin changes     Eyes: Denies vision changes    Ears/Nose/Throat: Denies nasal congestion or sore throat     Respiratory: Denies SOB or cough     Cardiovascular: Denies CP, palpitations, edema      Gastrointestinal: Denies abdominal pain,  N/V, constipation, or diarrhea    Genitourinary: Denies urinary symptoms    Neurologic: See HPI.     MSK: See HPI. Psychiatric: Denies sleep disturbance, anxiety, depression    Hematologic/Lymphatic/Immunologic: Denies bruising       Physical Exam:   Blood pressure (!) 149/87, pulse 84, height 5' 3\" (1.6 m), weight 188 lb (85.3 kg), last menstrual period 03/01/2006, not currently breastfeeding. General: well developed and well nourished in no acute distress. Body habitus is obese  HEENT: No rhinorrhea, sneezing, yawning, or lacrimation. No scleral icterus or conjunctival injection. Resp: symmetrical chest expansion, unlabored breathing, respirations unlabored. CV: Heart rate is regular. Peripheral pulses are palpable  Lymph: No visible regional lymphadenopathy. Skin: No rashes or ecchymosis. Normal turgor. Psych: Mood is calm.  Affect is normal.   Ext: No edema noted     MSK:   Back/Hip Exam:   Inspection: Pelvis was asymmetric. Lumbar lordotic curvature was normal. There was no scoliosis. No ecchymoses or erythema. Palpation: Palpatory exam revealed no tenderness along lumbosacral paraspinals, midline spine, SI joint sulcus, TTP bilateral glute muscles, bilateral piriformis, greater trochanters and TFL on both side. TTP bilateral hip joints. There was no paraspinal spasms. There were no trigger points. ROM: ROM lumbar decreased, hip IR decreased with pain   Special/provocative testing:   Seating SLR negative. She has diffuse tenderness to palpation throughout legs. She has migrating pain for example- when I palpated her right groin, she stated she had pain in the left groin. Neurological Exam:  Strength:   R  L  Hip Flex  5  5  Knee Ext  5  5  Ankle dorsi  5  5  EHL   5 5  Ankle Plantar  5  5    Sensory:  Intact for light touch in all lower extremity dermatomes. Reflexes:   R  L  Patellar  (2+) (2+)  Ankle Jerk  (2+) (2+)      Gait is Antalgic. Heel and toe walk are normal.  Tandem walk is normal        Imaging: (personally reviewed by me 11/12/20)  MRI L spine   MRI  BL hips     Impression:   Pauly Oakley is a 63 y.o. female    1. Bilateral leg pain    2. Bilateral primary osteoarthritis of hip    3. Bilateral hip pain    4. Lumbosacral radiculitis    5. Myalgia      Pain of unknown origin. Plan:   · Will do bilateral hip injections as requested for diagnostic purposes. · MRI lumbar reviewed. Only mild stenosis. ? S1 radiculitis ? Discussed doing epidural as diagnostic test. She does not want epidurals. · Refer to PT   · Follow up with PCP regarding myalgia and patient reported diagnosis of PMR     The patient was educated about the diagnosis, prognosis, indications, risks and benefits of treatment. An opportunity to ask questions was given to the patient and questions were answered. The patient agreed to proceed with the recommended treatment as described above.         Thank you for the consultation and for allowing me to participate in the care of this patient. Sincerely,     Juan Manuel Schulz DO, FAAPMR   Board Certified Physical Medicine and Rehabilitation    The patient was counseled at length about the risks of ramos Covid-19 during their perioperative period and any recovery window from their procedure. The patient was made aware that ramos Covid-19  may worsen their prognosis for recovering from their procedure  and lend to a higher morbidity and/or mortality risk. All material risks, benefits, and reasonable alternatives including postponing the procedure were discussed. The patient does wish to proceed with the procedure at this time.

## 2020-11-12 NOTE — H&P
Elliot Breanas, [address] [facility] Physical Medicine and Rehabilitation  [address] [facility] [place]  Phone: [phone]  Fax: [phone]    PCP: Lee Johnson MD  Date of visit: 11/12/20    Chief Complaint   Patient presents with    Hip Pain     new patient        Dear Dr. Jose Manuel Delatorre,     Thank you for referring your patient to be seen. As you know,  Liv Witt is a 63 y.o. female with past medical history as below who presents with diffuse complaints of hip, buttock and leg pain and trouble walking that started a few months ago. There was a sudden onset of  pain after no known injury. Now, the pain is constant and occurs daily. The pain is rated Pain Score:   9, is described as aching, sharp, shooting, burning, and is located in bilateral groins, buttocks, hips, knees, feet, ankles. Pain is very diffuse and migrates. She has no back pain. She states she has a feeling of getting stuck when she walks. The symptoms have been better since onset. The pain is better with nothing. The pain is worse with standing and walking. There is associated numbness/tingling. There is weakness. There is no bowel/bladder changes. She states she had labwork done and was told she had PMR. I have no records of this. The prior workup has included: Xray, MRI bilateral hips, MRI L spine .      The prior treatment has included:  PT: none    Chiropractic: none    Modalities: none    OTC Tylenol: none   Prednisone    NSAIDS: none   Opioids: yes with no relief   Membrane stabilizers: none  Muscle relaxers: none   Previous injections: none    Previous surgery at this site: lumbar fusion    Allergies   Allergen Reactions    Aspirin Swelling    Prochlorperazine Edisylate Swelling    Tegretol [Carbamazepine] Swelling    Compazine [Prochlorperazine Maleate]        Current Outpatient Medications   Medication Sig Dispense Refill    QUEtiapine (SEROQUEL) 25 MG tablet Take 25 mg by mouth nightly Diabetes Father     Heart Disease Father     High Blood Pressure Father     High Cholesterol Father        Social History     Tobacco Use    Smoking status: Current Every Day Smoker     Packs/day: 0.25     Years: 44.00     Pack years: 11.00     Types: Cigarettes    Smokeless tobacco: Never Used    Tobacco comment: trying to quit   Substance Use Topics    Alcohol use: Not Currently     Comment: very occasional    Drug use: No          Functional Status: The patient is able to ambulate and perform activities of daily living without the use of an assistive device. ROS: For more complete ROS answered by the patient, please see . Constitutional: Denies fevers, chills, night sweats, unintentional weight loss     Skin: Denies rash or skin changes     Eyes: Denies vision changes    Ears/Nose/Throat: Denies nasal congestion or sore throat     Respiratory: Denies SOB or cough     Cardiovascular: Denies CP, palpitations, edema      Gastrointestinal: Denies abdominal pain,  N/V, constipation, or diarrhea    Genitourinary: Denies urinary symptoms    Neurologic: See HPI.     MSK: See HPI. Psychiatric: Denies sleep disturbance, anxiety, depression    Hematologic/Lymphatic/Immunologic: Denies bruising       Physical Exam:   Blood pressure (!) 149/87, pulse 84, height 5' 3\" (1.6 m), weight 188 lb (85.3 kg), last menstrual period 03/01/2006, not currently breastfeeding. General: well developed and well nourished in no acute distress. Body habitus is obese  HEENT: No rhinorrhea, sneezing, yawning, or lacrimation. No scleral icterus or conjunctival injection. Resp: symmetrical chest expansion, unlabored breathing, respirations unlabored. CV: Heart rate is regular. Peripheral pulses are palpable  Lymph: No visible regional lymphadenopathy. Skin: No rashes or ecchymosis. Normal turgor. Psych: Mood is calm.  Affect is normal.   Ext: No edema noted     MSK:   Back/Hip Exam:   Inspection: Pelvis was asymmetric. Lumbar lordotic curvature was normal. There was no scoliosis. No ecchymoses or erythema. Palpation: Palpatory exam revealed no tenderness along lumbosacral paraspinals, midline spine, SI joint sulcus, TTP bilateral glute muscles, bilateral piriformis, greater trochanters and TFL on both side. TTP bilateral hip joints. There was no paraspinal spasms. There were no trigger points. ROM: ROM lumbar decreased, hip IR decreased with pain   Special/provocative testing:   Seating SLR negative. She has diffuse tenderness to palpation throughout legs. She has migrating pain for example- when I palpated her right groin, she stated she had pain in the left groin. Neurological Exam:  Strength:   R  L  Hip Flex  5  5  Knee Ext  5  5  Ankle dorsi  5  5  EHL   5 5  Ankle Plantar  5  5    Sensory:  Intact for light touch in all lower extremity dermatomes. Reflexes:   R  L  Patellar  (2+) (2+)  Ankle Jerk  (2+) (2+)      Gait is Antalgic. Heel and toe walk are normal.  Tandem walk is normal        Imaging: (personally reviewed by me 11/12/20)  MRI L spine   MRI  BL hips     Impression:   Ivan Garcia is a 63 y.o. female    1. Bilateral leg pain    2. Bilateral primary osteoarthritis of hip    3. Bilateral hip pain    4. Lumbosacral radiculitis    5. Myalgia      Pain of unknown origin. Plan:   · Will do bilateral hip injections as requested for diagnostic purposes. · MRI lumbar reviewed. Only mild stenosis. ? S1 radiculitis ? Discussed doing epidural as diagnostic test. She does not want epidurals. · Refer to PT   · Follow up with PCP regarding myalgia and patient reported diagnosis of PMR     The patient was educated about the diagnosis, prognosis, indications, risks and benefits of treatment. An opportunity to ask questions was given to the patient and questions were answered. The patient agreed to proceed with the recommended treatment as described above.         Thank you for the consultation and for allowing me to participate in the care of this patient. Sincerely,     Keven Mabry DO, FAAPMR   Board Certified Physical Medicine and Rehabilitation    The patient was counseled at length about the risks of ramos Covid-19 during their perioperative period and any recovery window from their procedure. The patient was made aware that ramos Covid-19  may worsen their prognosis for recovering from their procedure  and lend to a higher morbidity and/or mortality risk. All material risks, benefits, and reasonable alternatives including postponing the procedure were discussed. The patient does wish to proceed with the procedure at this time.

## 2020-11-13 ENCOUNTER — TELEPHONE (OUTPATIENT)
Dept: PHYSICAL MEDICINE AND REHAB | Age: 60
End: 2020-11-13

## 2020-11-19 LAB
SARS-COV-2: NOT DETECTED
SOURCE: NORMAL

## 2020-11-23 ENCOUNTER — HOSPITAL ENCOUNTER (OUTPATIENT)
Age: 60
Setting detail: OUTPATIENT SURGERY
Discharge: HOME | End: 2020-11-23
Attending: PHYSICAL MEDICINE & REHABILITATION | Admitting: PHYSICAL MEDICINE & REHABILITATION
Payer: COMMERCIAL

## 2020-11-23 ENCOUNTER — HOSPITAL ENCOUNTER (OUTPATIENT)
Dept: OPERATING ROOM | Age: 60
Setting detail: OUTPATIENT SURGERY
Discharge: HOME | End: 2020-11-23
Attending: PHYSICAL MEDICINE & REHABILITATION
Payer: COMMERCIAL

## 2020-11-23 VITALS
DIASTOLIC BLOOD PRESSURE: 79 MMHG | TEMPERATURE: 95.7 F | BODY MASS INDEX: 32.78 KG/M2 | HEART RATE: 72 BPM | RESPIRATION RATE: 12 BRPM | OXYGEN SATURATION: 95 % | WEIGHT: 185 LBS | HEIGHT: 63 IN | SYSTOLIC BLOOD PRESSURE: 153 MMHG

## 2020-11-23 PROCEDURE — 6360000004 HC RX CONTRAST MEDICATION: Performed by: PHYSICAL MEDICINE & REHABILITATION

## 2020-11-23 PROCEDURE — 2500000003 HC RX 250 WO HCPCS: Performed by: PHYSICAL MEDICINE & REHABILITATION

## 2020-11-23 PROCEDURE — 20610 DRAIN/INJ JOINT/BURSA W/O US: CPT | Performed by: PHYSICAL MEDICINE & REHABILITATION

## 2020-11-23 PROCEDURE — 3600000005 HC SURGERY LEVEL 5 BASE: Performed by: PHYSICAL MEDICINE & REHABILITATION

## 2020-11-23 PROCEDURE — 2709999900 HC NON-CHARGEABLE SUPPLY: Performed by: PHYSICAL MEDICINE & REHABILITATION

## 2020-11-23 PROCEDURE — 77002 NEEDLE LOCALIZATION BY XRAY: CPT | Performed by: PHYSICAL MEDICINE & REHABILITATION

## 2020-11-23 PROCEDURE — 7100000010 HC PHASE II RECOVERY - FIRST 15 MIN: Performed by: PHYSICAL MEDICINE & REHABILITATION

## 2020-11-23 PROCEDURE — 3209999900 FLUORO FOR SURGICAL PROCEDURES

## 2020-11-23 PROCEDURE — 6360000002 HC RX W HCPCS: Performed by: PHYSICAL MEDICINE & REHABILITATION

## 2020-11-23 RX ORDER — LIDOCAINE HYDROCHLORIDE 10 MG/ML
INJECTION, SOLUTION EPIDURAL; INFILTRATION; INTRACAUDAL; PERINEURAL PRN
Status: DISCONTINUED | OUTPATIENT
Start: 2020-11-23 | End: 2020-11-23 | Stop reason: ALTCHOICE

## 2020-11-23 ASSESSMENT — PAIN SCALES - GENERAL: PAINLEVEL_OUTOF10: 3

## 2020-11-23 ASSESSMENT — PAIN - FUNCTIONAL ASSESSMENT: PAIN_FUNCTIONAL_ASSESSMENT: 0-10

## 2020-11-23 NOTE — H&P
Juan Manuel Schulz, [address] [facility] Physical Medicine and Rehabilitation  [address] [facility] [facility] [place]  Phone: [phone]  Fax: [phone]     PCP: Fatimah Quezada MD  Date of visit: 11/12/20          Chief Complaint   Patient presents with    Hip Pain       new patient          Dear Dr. David Elizabeth,      Thank you for referring your patient to be seen.     As you know,  Ivan Garcia is a 63 y.o. female with past medical history as below who presents with diffuse complaints of hip, buttock and leg pain and trouble walking that started a few months ago. There was a sudden onset of  pain after no known injury. Now, the pain is constant and occurs daily. The pain is rated Pain Score:   9, is described as aching, sharp, shooting, burning, and is located in bilateral groins, buttocks, hips, knees, feet, ankles. Pain is very diffuse and migrates. She has no back pain. She states she has a feeling of getting stuck when she walks. The symptoms have been better since onset. The pain is better with nothing. The pain is worse with standing and walking. There is associated numbness/tingling. There is weakness. There is no bowel/bladder changes. She states she had labwork done and was told she had PMR.  I have no records of this.      The prior workup has included: Xray, MRI bilateral hips, MRI L spine .      The prior treatment has included:  PT: none    Chiropractic: none    Modalities: none    OTC Tylenol: none   Prednisone    NSAIDS: none   Opioids: yes with no relief   Membrane stabilizers: none  Muscle relaxers: none   Previous injections: none    Previous surgery at this site: lumbar fusion          Allergies   Allergen Reactions    Aspirin Swelling    Prochlorperazine Edisylate Swelling    Tegretol [Carbamazepine] Swelling    Compazine [Prochlorperazine Maleate]                  Current Outpatient Medications   Medication Sig Dispense Refill    QUEtiapine (SEROQUEL) 25 MG Cholesterol Mother      Arthritis Father      Asthma Father      Cancer Father      Depression Father      Diabetes Father      Heart Disease Father      High Blood Pressure Father      High Cholesterol Father           Social History      Tobacco Use    Smoking status: Current Every Day Smoker       Packs/day: 0.25       Years: 44.00       Pack years: 11.00       Types: Cigarettes    Smokeless tobacco: Never Used    Tobacco comment: trying to quit   Substance Use Topics    Alcohol use: Not Currently       Comment: very occasional    Drug use: No            Functional Status: The patient is able to ambulate and perform activities of daily living without the use of an assistive device.            ROS: For more complete ROS answered by the patient, please see . Constitutional: Denies fevers, chills, night sweats, unintentional weight loss     Skin: Denies rash or skin changes     Eyes: Denies vision changes    Ears/Nose/Throat: Denies nasal congestion or sore throat     Respiratory: Denies SOB or cough     Cardiovascular: Denies CP, palpitations, edema      Gastrointestinal: Denies abdominal pain,  N/V, constipation, or diarrhea    Genitourinary: Denies urinary symptoms    Neurologic: See HPI.     MSK: See HPI. Psychiatric: Denies sleep disturbance, anxiety, depression    Hematologic/Lymphatic/Immunologic: Denies bruising         Physical Exam:   Blood pressure (!) 149/87, pulse 84, height 5' 3\" (1.6 m), weight 188 lb (85.3 kg), last menstrual period 03/01/2006, not currently breastfeeding. General: well developed and well nourished in no acute distress. Body habitus is obese  HEENT: No rhinorrhea, sneezing, yawning, or lacrimation. No scleral icterus or conjunctival injection. Resp: symmetrical chest expansion, unlabored breathing, respirations unlabored. CV: Heart rate is regular. Peripheral pulses are palpable  Lymph: No visible regional lymphadenopathy.   Skin: No rashes or ecchymosis. Normal turgor. Psych: Mood is calm. Affect is normal.   Ext: No edema noted      MSK:   Back/Hip Exam:   Inspection: Pelvis was asymmetric. Lumbar lordotic curvature was normal. There was no scoliosis. No ecchymoses or erythema. Palpation: Palpatory exam revealed no tenderness along lumbosacral paraspinals, midline spine, SI joint sulcus, TTP bilateral glute muscles, bilateral piriformis, greater trochanters and TFL on both side. TTP bilateral hip joints. There was no paraspinal spasms. There were no trigger points. ROM: ROM lumbar decreased, hip IR decreased with pain   Special/provocative testing:   Seating SLR negative.      She has diffuse tenderness to palpation throughout legs. She has migrating pain for example- when I palpated her right groin, she stated she had pain in the left groin.        Neurological Exam:  Strength:                     R          L  Hip Flex                       5          5  Knee Ext                     5          5  Ankle dorsi                  5          5  EHL                             5          5  Ankle Plantar               5          5     Sensory:  Intact for light touch in all lower extremity dermatomes.      Reflexes:                     R          L  Patellar                        (2+)      (2+)  Ankle Jerk                   (2+)      (2+)        Gait is Antalgic. Heel and toe walk are normal.  Tandem walk is normal           Imaging: (personally reviewed by me 11/12/20)  MRI L spine   MRI  BL hips      Impression:   Talya Degroot is a 63 y.o. female     1. Bilateral leg pain    2. Bilateral primary osteoarthritis of hip    3. Bilateral hip pain    4. Lumbosacral radiculitis    5. Myalgia       Pain of unknown origin.      Plan:   · Will do bilateral hip injections as requested for diagnostic purposes. · MRI lumbar reviewed. Only mild stenosis. ? S1 radiculitis ? Discussed doing epidural as diagnostic test. She does not want epidurals. · Refer to PT   · Follow up with PCP regarding myalgia and patient reported diagnosis of PMR     · The patient was educated about the diagnosis, prognosis, indications, risks and benefits of treatment. An opportunity to ask questions was given to the patient and questions were answered. The patient agreed to proceed with the recommended treatment as described above.         Thank you for the consultation and for allowing me to participate in the care of this patient.          Sincerely,      DO [place], [facility]   Board Certified Physical Medicine and Rehabilitation     The patient was counseled at length about the risks of ramos Covid-19 during their perioperative period and any recovery window from their procedure.  The patient was made aware that ramos Covid-19  may worsen their prognosis for recovering from their procedure  and lend to a higher morbidity and/or mortality risk.  All material risks, benefits, and reasonable alternatives including postponing the procedure were discussed.  The patient does wish to proceed with the procedure at this time.

## 2020-11-27 NOTE — DISCHARGE SUMMARY
[address]                               DISCHARGE SUMMARY    PATIENT NAME: Monet Franklin              :        1960  MED REC NO:   [id number]                            ROOM:       0330  ACCOUNT NO:   [de-identified]                           ADMIT DATE: 10/07/2020  PROVIDER:     Mary Arreola MD                  DISCHARGE DATE:  10/10/2020    This patient is a 25-year-old black female with a history of  hypertension, COPD, mitral valve prolapse, who presents to the hospital  secondary to dysfunctional gait. The patient states she has been  dragging her left lower extremity and not walking normally. The patient  states that the dragging of the left lower extremity started on the day  of admission. The patient was extremely concerned about a stroke. The  patient also is complaining of ataxia, numbness in the left leg,  weakness in the left upper extremity, and diffuse myalgias. The patient  denied any blurring of vision or slurred speech. The patient states the  symptoms came on suddenly within an hour's time. The patient was admitted to the hospital with the diagnoses of:  1. Acute peripheral neuropathy. 2.  Ataxia, rule out CVA and multiple sclerosis. 3.  Arthritis of lumbar spine. 4.  Anxiety. 5.  COPD. 6.  TIA. 7.  Gastritis. 8.  Herniated lumbar disk. 9.  Hypertension. 10.  Mitral valve prolapse. The patient was admitted. CT scan of the head was obtained. MRI of the  head was also obtained. Carotid ultrasounds were also obtained. The  patient was monitored very closely. MRI of the brain was unremarkable. There was no suggestion of an acute infarct. MRI was also obtained of  the left hip secondary to severe excruciating pain and inability to  ambulate. The MRI of the left hip revealed bilateral hip osteoarthrosis  without femoral head avascular necrosis.   There was left acetabular  labral tear but no right acetabular labral tear. There was also a  low-grade partial-thickness tear at the origin of the bilateral  hamstring tendons. The patient had carotid ultrasounds performed, and  there was no evidence of any significant stenosis in either internal  carotid arteries. The right internal carotid artery demonstrated 0-50%  stenosis and the left internal carotid artery also demonstrated 0-50%  stenosis. The bilateral vertebral arteries were patent with normal flow  also noted. The patient's remainder of the hospital course progressed  without any significant problems. Physical Therapy was consulted, and  the decision was made to discharge the patient on 10/10/2020. The  patient was seen in consultation for the labral tears by the orthopedic  surgical group of Dr. Alecia Johnson, and a conservative course of  treatment was recommended. The patient was also seen in the hospital by  the neurologist, Dr. Fer Rose, who thought the patient had a  vertebrobasilar TIA. The patient was discharged from the hospital in  stable condition on 10/10/2020. The patient's diagnosis at the time of discharge were the followin. Acute peripheral neuropathy. 2.  Ataxia. 3.  Torn labrum of the left hip. 4.  Arthritis of the lumbar spine. 5.  TIA. 6.  Anxiety. 7.  COPD. 8.  Arthritis of the hips. 9.  Herniated lumbar disk. 10.  Gastritis. 11.  Hypertension. 12.  Mitral valve prolapse. The patient was discharged from the hospital in stable condition and  instructed to seek followup medical attention with Dr. Bala Tong in 2-3 days. Medications at the time of discharge included the following:  Imdur 30  mg q. day, Coreg 25 mg twice a day, Norvasc 5 mg q. day, Hyzaar 100/25  one a day, Seroquel 25 mg at bedtime, Carafate 1 gm twice a day, and  Protonix 40 mg q. day. The patient, once again, was discharged in stable condition.   Instructed  to seek followup medical attention with  Chan Hahn in 2-3 days.         Summer Goff MD    D: 11/26/2020 9:57:20       T: 11/27/2020 7:42:05     [email or web address]  Job#: [id number]     Doc#: [id number]    CC:

## 2022-06-09 LAB
ANION GAP SERPL CALCULATED.3IONS-SCNC: 14 MMOL/L (ref 7–16)
BUN BLDV-MCNC: 16 MG/DL (ref 6–23)
CALCIUM SERPL-MCNC: 9.5 MG/DL (ref 8.6–10.2)
CHLORIDE BLD-SCNC: 101 MMOL/L (ref 98–107)
CO2: 23 MMOL/L (ref 22–29)
CREAT SERPL-MCNC: 1.2 MG/DL (ref 0.5–1)
GFR AFRICAN AMERICAN: 55
GFR NON-AFRICAN AMERICAN: 55 ML/MIN/1.73
GLUCOSE BLD-MCNC: 100 MG/DL (ref 74–99)
HCT VFR BLD CALC: 41.7 % (ref 34–48)
HEMOGLOBIN: 12.9 G/DL (ref 11.5–15.5)
MCH RBC QN AUTO: 28 PG (ref 26–35)
MCHC RBC AUTO-ENTMCNC: 30.9 % (ref 32–34.5)
MCV RBC AUTO: 90.7 FL (ref 80–99.9)
PDW BLD-RTO: 13.9 FL (ref 11.5–15)
PLATELET # BLD: 275 E9/L (ref 130–450)
PMV BLD AUTO: 10.6 FL (ref 7–12)
POTASSIUM SERPL-SCNC: 4 MMOL/L (ref 3.5–5)
RBC # BLD: 4.6 E12/L (ref 3.5–5.5)
SODIUM BLD-SCNC: 138 MMOL/L (ref 132–146)
WBC # BLD: 4.5 E9/L (ref 4.5–11.5)

## 2022-12-15 NOTE — ANESTHESIA POSTPROCEDURE EVALUATION
Department of Anesthesiology  Postprocedure Note    Patient: Amberly Robles  MRN: [id number]  YOB: 1960  Date of evaluation: 5/22/2019  Time:  3:57 PM     Procedure Summary     Date:  05/22/19 Room / Location:  [place] [address] / [facility] ENDOSCOPY    Anesthesia Start:  4584 Anesthesia Stop:  4333    Procedure:  EGD BIOPSY (N/A ) Diagnosis:  (ABDOMINAL PAIN)    Surgeon:  Sveta Wilson MD Responsible Provider:  Ok Amos MD    Anesthesia Type:  MAC ASA Status:  3          Anesthesia Type: MAC    Queenie Phase I: Queenie Score: 10    Queenie Phase II: Queenie Score: 10    Last vitals: Reviewed and per EMR flowsheets.        Anesthesia Post Evaluation    Patient location during evaluation: PACU  Patient participation: complete - patient participated  Level of consciousness: awake and alert  Airway patency: patent  Nausea & Vomiting: no nausea and no vomiting  Complications: no  Cardiovascular status: hemodynamically stable  Respiratory status: acceptable  Hydration status: euvolemic Libtayo Counseling- I discussed with the patient the risks of Libtayo including but not limited to nausea, vomiting, diarrhea, and bone or muscle pain.  The patient verbalized understanding of the proper use and possible adverse effects of Libtayo.  All of the patient's questions and concerns were addressed.

## (undated) DEVICE — CONTAINER SPEC COLL 960ML POLYPR TRIANG GRAD INTAKE/OUTPUT

## (undated) DEVICE — KIT BEDSIDE REVITAL OX 500ML

## (undated) DEVICE — BLOCK BITE 60FR CAREGUARD

## (undated) DEVICE — YANKAUER,BULB TIP,W/O VENT,RIGID,STERILE: Brand: MEDLINE

## (undated) DEVICE — SPONGE GZ 4IN 4IN 4 PLY N WVN AVANT

## (undated) DEVICE — KENDALL 450 SERIES MONITORING FOAM ELECTRODE - RECTANGULAR SHAPE ( 3/PK): Brand: KENDALL

## (undated) DEVICE — BANDAGE ADH W1XL3IN NAT FAB WVN FLX DURABLE N ADH PD SEAL

## (undated) DEVICE — FORCEPS BX L240CM JAW DIA2.8MM L CAP W/ NDL MIC MESH TOOTH

## (undated) DEVICE — TUBING, SUCTION, 1/4" X 10', STRAIGHT: Brand: MEDLINE

## (undated) DEVICE — GOWN ISOLATN REG YEL M WT MULTIPLY SIDETIE LEV 2

## (undated) DEVICE — MASK,FACE,MAXFLUIDPROTECT,SHIELD/ERLPS: Brand: MEDLINE

## (undated) DEVICE — LUBRICANT SURG JELLY ST BACTER TUBE 4.25OZ

## (undated) DEVICE — COVER,LIGHT HANDLE,FLX,1/PK: Brand: MEDLINE INDUSTRIES, INC.

## (undated) DEVICE — NON-DEHP CATHETER EXTENSION SET, MALE LUER LOCK ADAPTER

## (undated) DEVICE — ENCORE® LATEX TEXTURED SIZE 6.5, STERILE LATEX POWDER-FREE SURGICAL GLOVE: Brand: ENCORE

## (undated) DEVICE — 6 X 9  1.75MIL 4-WALL LABGUARD: Brand: MINIGRIP COMMERCIAL LLC

## (undated) DEVICE — TOWEL OR BLUEE 16X26IN ST 8 PACK ORB08 16X26ORTWL

## (undated) DEVICE — TOWEL,OR,DSP,ST,BLUE,STD,6/PK,12PK/CS: Brand: MEDLINE

## (undated) DEVICE — NEEDLE SPNL 22GA L3.5IN BLK HUB S STL REG WALL FIT STYL W/

## (undated) DEVICE — NEEDLE HYPO 18GA L1.5IN PNK POLYPR HUB S STL THN WALL FILL

## (undated) DEVICE — GAUZE,SPONGE,4"X4",12PLY,STERILE,LF,2'S: Brand: MEDLINE

## (undated) DEVICE — NEEDLE HYPO 25GA L1.5IN BLU POLYPR HUB S STL REG BVL STR

## (undated) DEVICE — 3M™ RED DOT™ MONITORING ELECTRODE WITH FOAM TAPE AND STICKY GEL 2560, 50/BAG, 20/CASE, 72/PLT: Brand: RED DOT™

## (undated) DEVICE — Device: Brand: DEFENDO VALVE AND CONNECTOR KIT

## (undated) DEVICE — Z DISCONTINUED APPLICATOR SURG PREP 0.35OZ 2% CHG 70% ISO ALC W/ HI LT

## (undated) DEVICE — 3 ML SYRINGE LUER-LOCK TIP: Brand: MONOJECT

## (undated) DEVICE — 6 ML SYRINGE LUER-LOCK TIP: Brand: MONOJECT

## (undated) DEVICE — MARKER,SKIN,WI/RULER AND LABELS: Brand: MEDLINE